# Patient Record
Sex: FEMALE | Race: WHITE | HISPANIC OR LATINO | URBAN - METROPOLITAN AREA
[De-identification: names, ages, dates, MRNs, and addresses within clinical notes are randomized per-mention and may not be internally consistent; named-entity substitution may affect disease eponyms.]

---

## 2020-12-21 ENCOUNTER — INPATIENT (INPATIENT)
Facility: HOSPITAL | Age: 70
LOS: 1 days | Discharge: HOME | DRG: 177 | End: 2020-12-23
Attending: HOSPITALIST | Admitting: INTERNAL MEDICINE
Payer: MEDICARE

## 2020-12-21 ENCOUNTER — TRANSCRIPTION ENCOUNTER (OUTPATIENT)
Age: 70
End: 2020-12-21

## 2020-12-21 VITALS
TEMPERATURE: 98 F | OXYGEN SATURATION: 96 % | HEART RATE: 102 BPM | RESPIRATION RATE: 18 BRPM | DIASTOLIC BLOOD PRESSURE: 82 MMHG | SYSTOLIC BLOOD PRESSURE: 156 MMHG

## 2020-12-21 DIAGNOSIS — R09.02 HYPOXEMIA: ICD-10-CM

## 2020-12-21 LAB
ALBUMIN SERPL ELPH-MCNC: 2.9 G/DL — LOW (ref 3.3–5)
ALP SERPL-CCNC: 308 U/L — HIGH (ref 40–120)
ALT FLD-CCNC: 108 U/L — HIGH (ref 12–78)
ANION GAP SERPL CALC-SCNC: 8 MMOL/L — SIGNIFICANT CHANGE UP (ref 5–17)
APTT BLD: 33.1 SEC — SIGNIFICANT CHANGE UP (ref 27.5–35.5)
AST SERPL-CCNC: 42 U/L — HIGH (ref 15–37)
BASE EXCESS BLDV CALC-SCNC: 2.7 MMOL/L — HIGH (ref -2–2)
BASOPHILS # BLD AUTO: 0.06 K/UL — SIGNIFICANT CHANGE UP (ref 0–0.2)
BASOPHILS NFR BLD AUTO: 0.6 % — SIGNIFICANT CHANGE UP (ref 0–2)
BILIRUB SERPL-MCNC: 0.5 MG/DL — SIGNIFICANT CHANGE UP (ref 0.2–1.2)
BUN SERPL-MCNC: 8 MG/DL — SIGNIFICANT CHANGE UP (ref 7–23)
CALCIUM SERPL-MCNC: 9 MG/DL — SIGNIFICANT CHANGE UP (ref 8.5–10.1)
CHLORIDE SERPL-SCNC: 108 MMOL/L — SIGNIFICANT CHANGE UP (ref 96–108)
CK SERPL-CCNC: 61 U/L — SIGNIFICANT CHANGE UP (ref 26–192)
CO2 SERPL-SCNC: 26 MMOL/L — SIGNIFICANT CHANGE UP (ref 22–31)
CREAT SERPL-MCNC: 0.73 MG/DL — SIGNIFICANT CHANGE UP (ref 0.5–1.3)
D DIMER BLD IA.RAPID-MCNC: 9077 NG/ML DDU — HIGH
EOSINOPHIL # BLD AUTO: 0.09 K/UL — SIGNIFICANT CHANGE UP (ref 0–0.5)
EOSINOPHIL NFR BLD AUTO: 0.8 % — SIGNIFICANT CHANGE UP (ref 0–6)
GLUCOSE SERPL-MCNC: 108 MG/DL — HIGH (ref 70–99)
HCO3 BLDV-SCNC: 26 MMOL/L — SIGNIFICANT CHANGE UP (ref 21–29)
HCT VFR BLD CALC: 34.3 % — LOW (ref 34.5–45)
HGB BLD-MCNC: 11.5 G/DL — SIGNIFICANT CHANGE UP (ref 11.5–15.5)
IMM GRANULOCYTES NFR BLD AUTO: 1.6 % — HIGH (ref 0–1.5)
INR BLD: 1.24 RATIO — HIGH (ref 0.88–1.16)
LACTATE SERPL-SCNC: 0.9 MMOL/L — SIGNIFICANT CHANGE UP (ref 0.7–2)
LYMPHOCYTES # BLD AUTO: 1.87 K/UL — SIGNIFICANT CHANGE UP (ref 1–3.3)
LYMPHOCYTES # BLD AUTO: 17.3 % — SIGNIFICANT CHANGE UP (ref 13–44)
MCHC RBC-ENTMCNC: 29.6 PG — SIGNIFICANT CHANGE UP (ref 27–34)
MCHC RBC-ENTMCNC: 33.5 GM/DL — SIGNIFICANT CHANGE UP (ref 32–36)
MCV RBC AUTO: 88.4 FL — SIGNIFICANT CHANGE UP (ref 80–100)
MONOCYTES # BLD AUTO: 1.18 K/UL — HIGH (ref 0–0.9)
MONOCYTES NFR BLD AUTO: 10.9 % — SIGNIFICANT CHANGE UP (ref 2–14)
NEUTROPHILS # BLD AUTO: 7.41 K/UL — HIGH (ref 1.8–7.4)
NEUTROPHILS NFR BLD AUTO: 68.8 % — SIGNIFICANT CHANGE UP (ref 43–77)
NT-PROBNP SERPL-SCNC: 123 PG/ML — SIGNIFICANT CHANGE UP (ref 0–125)
PCO2 BLDV: 39 MMHG — SIGNIFICANT CHANGE UP (ref 35–50)
PH BLDV: 7.45 — SIGNIFICANT CHANGE UP (ref 7.35–7.45)
PLATELET # BLD AUTO: 378 K/UL — SIGNIFICANT CHANGE UP (ref 150–400)
PO2 BLDV: 68 MMHG — HIGH (ref 25–45)
POTASSIUM SERPL-MCNC: 3.1 MMOL/L — LOW (ref 3.5–5.3)
POTASSIUM SERPL-SCNC: 3.1 MMOL/L — LOW (ref 3.5–5.3)
PROT SERPL-MCNC: 7.9 GM/DL — SIGNIFICANT CHANGE UP (ref 6–8.3)
PROTHROM AB SERPL-ACNC: 14.2 SEC — HIGH (ref 10.6–13.6)
RBC # BLD: 3.88 M/UL — SIGNIFICANT CHANGE UP (ref 3.8–5.2)
RBC # FLD: 12.6 % — SIGNIFICANT CHANGE UP (ref 10.3–14.5)
SAO2 % BLDV: 94 % — HIGH (ref 67–88)
SARS-COV-2 RNA SPEC QL NAA+PROBE: DETECTED
SODIUM SERPL-SCNC: 142 MMOL/L — SIGNIFICANT CHANGE UP (ref 135–145)
TROPONIN I SERPL-MCNC: <0.015 NG/ML — SIGNIFICANT CHANGE UP (ref 0.01–0.04)
WBC # BLD: 10.78 K/UL — HIGH (ref 3.8–10.5)
WBC # FLD AUTO: 10.78 K/UL — HIGH (ref 3.8–10.5)

## 2020-12-21 PROCEDURE — 93010 ELECTROCARDIOGRAM REPORT: CPT

## 2020-12-21 PROCEDURE — 71275 CT ANGIOGRAPHY CHEST: CPT | Mod: 26

## 2020-12-21 PROCEDURE — 80053 COMPREHEN METABOLIC PANEL: CPT

## 2020-12-21 PROCEDURE — 93970 EXTREMITY STUDY: CPT

## 2020-12-21 PROCEDURE — 86803 HEPATITIS C AB TEST: CPT

## 2020-12-21 PROCEDURE — 85027 COMPLETE CBC AUTOMATED: CPT

## 2020-12-21 PROCEDURE — 36415 COLL VENOUS BLD VENIPUNCTURE: CPT

## 2020-12-21 PROCEDURE — 99223 1ST HOSP IP/OBS HIGH 75: CPT

## 2020-12-21 PROCEDURE — 93970 EXTREMITY STUDY: CPT | Mod: 26

## 2020-12-21 PROCEDURE — 71045 X-RAY EXAM CHEST 1 VIEW: CPT | Mod: 26

## 2020-12-21 PROCEDURE — 71275 CT ANGIOGRAPHY CHEST: CPT

## 2020-12-21 RX ORDER — ENOXAPARIN SODIUM 100 MG/ML
90 INJECTION SUBCUTANEOUS EVERY 12 HOURS
Refills: 0 | Status: DISCONTINUED | OUTPATIENT
Start: 2020-12-22 | End: 2020-12-22

## 2020-12-21 RX ORDER — ACETAMINOPHEN 500 MG
650 TABLET ORAL EVERY 6 HOURS
Refills: 0 | Status: DISCONTINUED | OUTPATIENT
Start: 2020-12-21 | End: 2020-12-23

## 2020-12-21 RX ORDER — LEVOTHYROXINE SODIUM 125 MCG
75 TABLET ORAL DAILY
Refills: 0 | Status: DISCONTINUED | OUTPATIENT
Start: 2020-12-21 | End: 2020-12-23

## 2020-12-21 RX ORDER — ASCORBIC ACID 60 MG
500 TABLET,CHEWABLE ORAL DAILY
Refills: 0 | Status: DISCONTINUED | OUTPATIENT
Start: 2020-12-21 | End: 2020-12-23

## 2020-12-21 RX ORDER — DEXAMETHASONE 0.5 MG/5ML
6 ELIXIR ORAL DAILY
Refills: 0 | Status: DISCONTINUED | OUTPATIENT
Start: 2020-12-22 | End: 2020-12-23

## 2020-12-21 RX ORDER — PANTOPRAZOLE SODIUM 20 MG/1
40 TABLET, DELAYED RELEASE ORAL
Refills: 0 | Status: DISCONTINUED | OUTPATIENT
Start: 2020-12-21 | End: 2020-12-23

## 2020-12-21 RX ORDER — CHOLECALCIFEROL (VITAMIN D3) 125 MCG
1000 CAPSULE ORAL DAILY
Refills: 0 | Status: DISCONTINUED | OUTPATIENT
Start: 2020-12-21 | End: 2020-12-23

## 2020-12-21 RX ORDER — ONDANSETRON 8 MG/1
4 TABLET, FILM COATED ORAL EVERY 6 HOURS
Refills: 0 | Status: DISCONTINUED | OUTPATIENT
Start: 2020-12-21 | End: 2020-12-23

## 2020-12-21 RX ORDER — DEXAMETHASONE 0.5 MG/5ML
6 ELIXIR ORAL ONCE
Refills: 0 | Status: COMPLETED | OUTPATIENT
Start: 2020-12-21 | End: 2020-12-21

## 2020-12-21 RX ORDER — ENOXAPARIN SODIUM 100 MG/ML
90 INJECTION SUBCUTANEOUS ONCE
Refills: 0 | Status: COMPLETED | OUTPATIENT
Start: 2020-12-21 | End: 2020-12-21

## 2020-12-21 RX ORDER — LEVOTHYROXINE SODIUM 125 MCG
1 TABLET ORAL
Qty: 0 | Refills: 0 | DISCHARGE

## 2020-12-21 RX ORDER — ZINC SULFATE TAB 220 MG (50 MG ZINC EQUIVALENT) 220 (50 ZN) MG
220 TAB ORAL DAILY
Refills: 0 | Status: DISCONTINUED | OUTPATIENT
Start: 2020-12-21 | End: 2020-12-23

## 2020-12-21 RX ORDER — ALBUTEROL 90 UG/1
2 AEROSOL, METERED ORAL ONCE
Refills: 0 | Status: COMPLETED | OUTPATIENT
Start: 2020-12-21 | End: 2020-12-21

## 2020-12-21 RX ADMIN — Medication 6 MILLIGRAM(S): at 16:52

## 2020-12-21 RX ADMIN — ALBUTEROL 2 PUFF(S): 90 AEROSOL, METERED ORAL at 15:32

## 2020-12-21 RX ADMIN — ENOXAPARIN SODIUM 90 MILLIGRAM(S): 100 INJECTION SUBCUTANEOUS at 20:39

## 2020-12-21 NOTE — ED PROVIDER NOTE - NS ED ROS FT
Review of Systems:  	•	CONSTITUTIONAL: no fever  	•	SKIN: no rash  	•	RESPIRATORY: +shortness of breath, +NO, +cough  	•	CARDIAC: no chest pain  	•	GI:  no abd pain, no nausea, no vomiting, no diarrhea  	•	GENITO-URINARY:  no dysuria  	•	MUSCULOSKELETAL:  no back pain, +calf pain  	•	NEUROLOGIC: no weakness, +headache  	•	ALLERGY: no rhinorrhea  	•	PSYSCHIATRIC: appropriate concern about symptoms

## 2020-12-21 NOTE — H&P ADULT - ASSESSMENT
COVID   Bilateral Infiltrates  Hypoxia to 90%  Elevated Infl Markers DD 9000  Hypokalemia  -    Pt is  admitted w/  COVID -19   Bilateral Infiltrates  Hypoxia to 90%  Elevated Infl Markers DD 9077, CRP 10, Ferritin 467, procalcitonin 0.18,  mild transaminitis  Hypokalemia  CT concerning for scattered tiny small PEs  vs incomplete opacification and COVID-19 peripheral opacities  - s/p lovenox 90 mg in the ED, will cont  - s/p decadron 6mg, cont with daily 6mg  - O2 support and proning protocol  - ID consult  - replete potassium  - DVT prophylaxis : pt on lovenox  - vteIMPROVE VTE Individual Risk Assessment    RISK                                                                Points    [  ] Previous VTE                                                  3    [ ? ] Thrombophilia                                               2    [  ] Lower limb paralysis                                      2        (unable to hold up >15 seconds)      [  ] Current Cancer                                              2         (within 6 months)    [X  ] Immobilization > 24 hrs                                1    [  ] ICU/CCU stay > 24 hours                              1    [ X ] Age > 60                                                      1    IMPROVE VTE Score ___4______    IMPROVE Score 0-1: Low Risk, No VTE prophylaxis required for most patients, encourage ambulation.   IMPROVE Score 2-3: At risk, pharmacologic VTE prophylaxis is indicated for most patients (in the absence of a contraindication)  IMPROVE Score > or = 4: High Risk, pharmacologic VTE prophylaxis is indicated for most patients (in the absence of a contraindication)   Pt is  admitted w/  COVID -19   Bilateral Infiltrates  Hypoxia to 90% nintermittently  Elevated Infl Markers DD 9077, CRP 10, Ferritin 467, procalcitonin 0.18,  mild transaminitis  Hypokalemia  CT concerning for scattered tiny small PEs  vs incomplete opacification and COVID-19 peripheral opacities  - s/p lovenox 90 mg in the ED, will cont  - s/p decadron 6mg, cont with daily 6mg  - note on telemetry , pt is now 95% on RA  - O2 support and proning protocol as needed  - ID consult  - replete potassium  - DVT prophylaxis : pt on lovenox  - vteIMPROVE VTE Individual Risk Assessment    RISK                                                                Points    [  ] Previous VTE                                                  3    [ ? ] Thrombophilia                                               2    [  ] Lower limb paralysis                                      2        (unable to hold up >15 seconds)      [  ] Current Cancer                                              2         (within 6 months)    [X  ] Immobilization > 24 hrs                                1    [  ] ICU/CCU stay > 24 hours                              1    [ X ] Age > 60                                                      1    IMPROVE VTE Score ___4______    IMPROVE Score 0-1: Low Risk, No VTE prophylaxis required for most patients, encourage ambulation.   IMPROVE Score 2-3: At risk, pharmacologic VTE prophylaxis is indicated for most patients (in the absence of a contraindication)  IMPROVE Score > or = 4: High Risk, pharmacologic VTE prophylaxis is indicated for most patients (in the absence of a contraindication)

## 2020-12-21 NOTE — ED PROVIDER NOTE - CLINICAL SUMMARY MEDICAL DECISION MAKING FREE TEXT BOX
COVID + with viral PNA, O2 sat intermittently 90% on RA, will give Decadron, will evaluate for DVT/PE since d dimer is elevated. Hospitalist with f/u CT angio results. COVID + with viral PNA, O2 sat intermittently 90% on RA, will give Decadron, will evaluate for DVT/PE since d dimer is elevated. Hospitalist will f/u CT angio results.

## 2020-12-21 NOTE — H&P ADULT - HISTORY OF PRESENT ILLNESS
71 y/o Patient p/w CC shortness of breath x 2 days, new onset. +associated NO. Pt COVID + on 12/03/20. Pt also c/o headache, persistent cough and b/l calf pain. Pt sent from Urgent Care to r/o PE. Pt intermittently desats to 90% on RA.    	PMH/PSH relevant for: Hypothyroidism  	ROS negative for: fever, Chest pain, Nausea, vomiting, diarrhea, abdominal pain, dysuria Pt is a 71 y/o Female w/PmHx Hypothyroidism who was  diagnosed with COVID-19 on Dec 13, 2020 who is now sent from Urgent care  to the ED with  increasing  shortness of breath  for two days and also dyspnea on exertion. The provider at Urgent Care sent pt to be ruled out for PE.   Pt's O2 sat has gone down to  90% on RA.   She reports having some lower calf pain last week .  She reports having a cough and headache.  She denies fever/chills,  no chest pain/no abd pain or symptoms,  no urinary complaints.

## 2020-12-21 NOTE — ED PROVIDER NOTE - CARE PLAN
Principal Discharge DX:	Hypoxia  Secondary Diagnosis:	COVID-19  Secondary Diagnosis:	Infiltrate of lung present on imaging of chest

## 2020-12-21 NOTE — H&P ADULT - NSHPLABSRESULTS_GEN_ALL_CORE
14:15 EKG:  NSR 98 bpm, tiny qs in I, avL      < from: CT Angio Chest PE Protocol w/ IV Cont (12.21.20 @ 18:17) >  EXAM:  CT ANGIO CHEST PE PROTOCOL IC                         PROCEDURE DATE:  12/21/2020      INTERPRETATION:  CLINICAL INFORMATION: Shortness of breath. Covid positive. Evaluate for pulmonary embolism    COMPARISON: Chest x-ray 12/21/2020.    PROCEDURE:  CT Angiography of the Chest.  90 ml of Omnipaque 350 was injected intravenously. 10 ml were discarded.  Sagittal and coronal reformats were performed as well as 3D (MIP) reconstructions.    FINDINGS:    LUNGS AND AIRWAYS: Patent central airways.  Scattered patchy primarily peripheral lung opacities consistent with Covid19 infection.  PLEURA: No pleural effusion.  MEDIASTINUM AND CHARY: No lymphadenopathy.  VESSELS: There is no central or segmental pulmonary embolism. In the subsegmental areas there is the suggestion of multiple small bilateral tiny pulmonary emboli versus incomplete opacification. For example series 3 image 273 in the right lower lobe.  HEART: Heart size is normal. No pericardial effusion.  CHEST WALL AND LOWER NECK: Partially imaged prominent left posterior lateral intramuscular lipoma.  VISUALIZED UPPER ABDOMEN: Patient is post cholecystectomy and there is partially imaged marked enlargement of the biliary tree up to 18 mm. Partially imaged 1.3 cm left periaortic cystic nodule.  BONES: Degenerative changes.    IMPRESSION:  Scattered patchy primarily peripheral lung opacities consistent with Covid19 infection.    There is no central or segmental pulmonary embolism. In the subsegmental areas there is the suggestion of multiple small bilateral tiny pulmonary emboli versus incomplete opacification. For example series 3 image 273 in the right lower lobe.        PATRICIA BRANHAM MD; Attending Radiologist  This document has been electronically signed. Dec 21 2020  6:56PM  < end of copied text >

## 2020-12-21 NOTE — ED STATDOCS - PROGRESS NOTE DETAILS
[place]is [place] for attending Dr. Angel: 71 y/o female with no significant PMHx presents to the ED c/o SOB. Pt COVID +. Pt was seen at urgent care and was sent to r/o PE. Pt hypoxic, 92% on RA. Will send pt to main ED for further evaluation.

## 2020-12-21 NOTE — ED PROVIDER NOTE - OBJECTIVE STATEMENT
Pertinent HPI/PMH/PSH/FHx/SHx and Review of Systems contained within  HPI: 69 y/o Patient p/w CC shortness of breath x 2 days, new onset. +associated NO. Pt COVID + on 12/02/20. Pt also c/o headache, persistent cough and b/l calf pain. Pt sent from Urgent Care to r/o PE. Pt intermittently desats to 90% on RA. PMD: Dr. Josette Carpenter.   PMH/PSH relevant for: Hypothyroidism  ROS negative for: fever, Chest pain, Nausea, vomiting, diarrhea, abdominal pain, dysuria    FamilyHx and SocialHx not otherwise contributory Pertinent HPI/PMH/PSH/FHx/SHx and Review of Systems contained within  HPI: 69 y/o Patient p/w CC shortness of breath x 2 days, new onset. +associated NO. Pt COVID + on 12/03/20. Pt also c/o headache, persistent cough and b/l calf pain. Pt sent from Urgent Care to r/o PE. Pt intermittently desats to 90% on RA. PMD: Dr. Josette Carpenter.   PMH/PSH relevant for: Hypothyroidism  ROS negative for: fever, Chest pain, Nausea, vomiting, diarrhea, abdominal pain, dysuria    FamilyHx and SocialHx not otherwise contributory

## 2020-12-21 NOTE — ED ADULT NURSE NOTE - OBJECTIVE STATEMENT
Pt presents to ED for worsening SOB x2 days. Pt COVID + on december 3,2020. Pt sent from urgent care to r/o PE. On ambulation, pt O2 saturation 92-93%. Initially when dx developed headaches. Now endorses persistent non-productive cough, and occasional diarrhea. Denies fever/chills. pt on the monitor, will ctm.

## 2020-12-21 NOTE — ED PROVIDER NOTE - PROGRESS NOTE DETAILS
jin: Discussed need to admit with patient & discussed risk and benefits.  Patient agreed to admission.  Discussed case w/ admitting doctor - agreed to admit to their service. will place bridge orders. Accepting physician said: APPROVE PT TO MOVE    prior to inpatient team evaluation jin: dr corea will f/u cta & u/s results

## 2020-12-21 NOTE — ED PROVIDER NOTE - PHYSICAL EXAMINATION
*GEN: No acute distress, well appearing   *HEAD: Normocephalic, Atraumatic  *EYES/NOSE: b/l Pupils symmetric & Reactive to light, EOMI b/l  *THROAT: airway patent, moist mucous membranes  *NECK: Neck supple  *PULMONARY: No Respiratory distress, symmetric b/l chest rise  *CARDIAC: s1s2, regular rhythm   *ABDOMEN:  Non Tender, Non Distended, soft, no guarding, no rebound, no masses   *BACK: no CVA tenderness, No midline vertebral tenderness to palpation   *EXTREMITIES: symmetric pulses, 2+ DP & radial pulses, no cyanosis, no edema   *SKIN: no rash, no bruising   *NEUROLOGIC: alert,  full active & passive ROM in all 4 extremities,   *PSYCH: appropriate concern about symptoms, pleasant

## 2020-12-21 NOTE — ED ADULT TRIAGE NOTE - CHIEF COMPLAINT QUOTE
Patient presents in ED with SOB sent from Urgent care to r/o PE. Patient appears in no distress in triage.

## 2020-12-21 NOTE — H&P ADULT - NSHPPHYSICALEXAM_GEN_ALL_CORE
ICU Vital Signs Last 24 Hrs    T(F): 98.5 (21 Dec 2020 14:13), Max: 98.5 (21 Dec 2020 14:13)  HR: 102 (21 Dec 2020 14:13) (102 - 102)  BP: 156/82 (21 Dec 2020 14:13) (156/82 - 156/82)  BP(mean): 103 (21 Dec 2020 14:13) (103 - 103)    RR: 18 (21 Dec 2020 14:13) (18 - 18)  SpO2: 90% (21 Dec 2020 15:00) (90% - 92%)

## 2020-12-22 LAB
ALBUMIN SERPL ELPH-MCNC: 2.6 G/DL — LOW (ref 3.3–5)
ALP SERPL-CCNC: 277 U/L — HIGH (ref 40–120)
ALT FLD-CCNC: 91 U/L — HIGH (ref 12–78)
ANION GAP SERPL CALC-SCNC: 7 MMOL/L — SIGNIFICANT CHANGE UP (ref 5–17)
AST SERPL-CCNC: 34 U/L — SIGNIFICANT CHANGE UP (ref 15–37)
BILIRUB SERPL-MCNC: 0.3 MG/DL — SIGNIFICANT CHANGE UP (ref 0.2–1.2)
BUN SERPL-MCNC: 12 MG/DL — SIGNIFICANT CHANGE UP (ref 7–23)
CALCIUM SERPL-MCNC: 9.1 MG/DL — SIGNIFICANT CHANGE UP (ref 8.5–10.1)
CHLORIDE SERPL-SCNC: 108 MMOL/L — SIGNIFICANT CHANGE UP (ref 96–108)
CO2 SERPL-SCNC: 25 MMOL/L — SIGNIFICANT CHANGE UP (ref 22–31)
CREAT SERPL-MCNC: 0.68 MG/DL — SIGNIFICANT CHANGE UP (ref 0.5–1.3)
CRP SERPL-MCNC: 10.01 MG/DL — HIGH (ref 0–0.4)
FERRITIN SERPL-MCNC: 467 NG/ML — HIGH (ref 15–150)
GLUCOSE SERPL-MCNC: 112 MG/DL — HIGH (ref 70–99)
HCT VFR BLD CALC: 31.4 % — LOW (ref 34.5–45)
HGB BLD-MCNC: 10.4 G/DL — LOW (ref 11.5–15.5)
MCHC RBC-ENTMCNC: 29.7 PG — SIGNIFICANT CHANGE UP (ref 27–34)
MCHC RBC-ENTMCNC: 33.1 GM/DL — SIGNIFICANT CHANGE UP (ref 32–36)
MCV RBC AUTO: 89.7 FL — SIGNIFICANT CHANGE UP (ref 80–100)
PLATELET # BLD AUTO: 368 K/UL — SIGNIFICANT CHANGE UP (ref 150–400)
POTASSIUM SERPL-MCNC: 4.3 MMOL/L — SIGNIFICANT CHANGE UP (ref 3.5–5.3)
POTASSIUM SERPL-SCNC: 4.3 MMOL/L — SIGNIFICANT CHANGE UP (ref 3.5–5.3)
PROCALCITONIN SERPL-MCNC: 0.18 NG/ML — HIGH (ref 0.02–0.1)
PROT SERPL-MCNC: 7.4 GM/DL — SIGNIFICANT CHANGE UP (ref 6–8.3)
RBC # BLD: 3.5 M/UL — LOW (ref 3.8–5.2)
RBC # FLD: 12.8 % — SIGNIFICANT CHANGE UP (ref 10.3–14.5)
SODIUM SERPL-SCNC: 140 MMOL/L — SIGNIFICANT CHANGE UP (ref 135–145)
WBC # BLD: 10.95 K/UL — HIGH (ref 3.8–10.5)
WBC # FLD AUTO: 10.95 K/UL — HIGH (ref 3.8–10.5)

## 2020-12-22 PROCEDURE — 99232 SBSQ HOSP IP/OBS MODERATE 35: CPT

## 2020-12-22 PROCEDURE — 99223 1ST HOSP IP/OBS HIGH 75: CPT

## 2020-12-22 RX ORDER — POTASSIUM CHLORIDE 20 MEQ
40 PACKET (EA) ORAL EVERY 4 HOURS
Refills: 0 | Status: COMPLETED | OUTPATIENT
Start: 2020-12-22 | End: 2020-12-22

## 2020-12-22 RX ORDER — SENNA PLUS 8.6 MG/1
2 TABLET ORAL AT BEDTIME
Refills: 0 | Status: DISCONTINUED | OUTPATIENT
Start: 2020-12-22 | End: 2020-12-23

## 2020-12-22 RX ORDER — RIVAROXABAN 15 MG-20MG
15 KIT ORAL
Refills: 0 | Status: DISCONTINUED | OUTPATIENT
Start: 2020-12-22 | End: 2020-12-23

## 2020-12-22 RX ORDER — RIVAROXABAN 15 MG-20MG
1 KIT ORAL
Qty: 40 | Refills: 0
Start: 2020-12-22 | End: 2021-01-10

## 2020-12-22 RX ORDER — POTASSIUM CHLORIDE 20 MEQ
10 PACKET (EA) ORAL
Refills: 0 | Status: DISCONTINUED | OUTPATIENT
Start: 2020-12-22 | End: 2020-12-22

## 2020-12-22 RX ORDER — POTASSIUM CHLORIDE 20 MEQ
40 PACKET (EA) ORAL ONCE
Refills: 0 | Status: COMPLETED | OUTPATIENT
Start: 2020-12-22 | End: 2020-12-22

## 2020-12-22 RX ADMIN — Medication 100 MILLIGRAM(S): at 04:18

## 2020-12-22 RX ADMIN — Medication 75 MICROGRAM(S): at 05:59

## 2020-12-22 RX ADMIN — RIVAROXABAN 15 MILLIGRAM(S): KIT at 21:23

## 2020-12-22 RX ADMIN — PANTOPRAZOLE SODIUM 40 MILLIGRAM(S): 20 TABLET, DELAYED RELEASE ORAL at 05:59

## 2020-12-22 RX ADMIN — Medication 40 MILLIEQUIVALENT(S): at 05:59

## 2020-12-22 RX ADMIN — ENOXAPARIN SODIUM 90 MILLIGRAM(S): 100 INJECTION SUBCUTANEOUS at 11:58

## 2020-12-22 RX ADMIN — SENNA PLUS 2 TABLET(S): 8.6 TABLET ORAL at 19:55

## 2020-12-22 RX ADMIN — Medication 500 MILLIGRAM(S): at 11:58

## 2020-12-22 RX ADMIN — Medication 650 MILLIGRAM(S): at 21:26

## 2020-12-22 RX ADMIN — Medication 10 MILLIGRAM(S): at 19:42

## 2020-12-22 RX ADMIN — Medication 40 MILLIEQUIVALENT(S): at 06:00

## 2020-12-22 RX ADMIN — Medication 6 MILLIGRAM(S): at 11:59

## 2020-12-22 RX ADMIN — ZINC SULFATE TAB 220 MG (50 MG ZINC EQUIVALENT) 220 MILLIGRAM(S): 220 (50 ZN) TAB at 11:58

## 2020-12-22 RX ADMIN — Medication 1000 UNIT(S): at 11:58

## 2020-12-22 NOTE — CONSULT NOTE ADULT - ASSESSMENT
69 y/o Female w/PmHx Hypothyroidism who was  diagnosed with COVID-19 on Dec 13, 2020 who is now sent from Urgent care  to the ED with increasing shortness of breath  for two days and also dyspnea on exertion. The provider at Urgent Care sent pt to be ruled out for PE. Pt's O2 sat has gone down to 90% on RA. She reports having some lower calf pain last week. She reports having a cough and headache. She denies fever/chills, no chest pain/no abd pain or symptoms, no urinary complaints. Here, covid-19 pcr positive, CTA with bilateral lung infiltrates, possible multiple small tiny pulmonary emboli, was given steroids, saturating well on RA, not on o2.     1. dyspnea. covid-19 viral syndrome. multifocal pneumonia. possible pulmonary emboli  - on ac  - not requiring supplemental o2, does not require remdesivir   - on decadron 6mg daily - can also consider dc as resp status stable  - isolation precautions  - fu cbc  - supportive care  - monitor o2 closely    2. other issues - care per medicine 
This is a 70 year old female sustaining pulmonary emboli in the hypercoaguable setting of COVID+ with high risk fro VTE due to current VTE and COVID. She is low bleeding risk. Recommend transitioning from lovenox to DOAC. Patient amenable to Xarelto. Will send to pharmacy.    Discussed Xarelto and the risks and benefits with patient. Emphasized the importance of taking medication twice daily to treat VTE. Verbalized understanding and is in agreement with treatment plan.    Plan:  ::DC Lovenox  ::Give Xarelto at next scheduled Lovenox dose- 10pm  ::Xarelto 15mg PO twice daily x 21 days, last dose 1/11/21  ::Starting 1/21/21: Xarelto 20 mg PO daily  ::Daily CBC/BMP  ::Venodynes  ::Enc ambulation    Thank you for this consult, will continue to follow.  Dispo: Home

## 2020-12-22 NOTE — CONSULT NOTE ADULT - SUBJECTIVE AND OBJECTIVE BOX
Patient is a 70y old  Female who presents with a chief complaint of NO  COVID Positive (21 Dec 2020 17:51)    HPI:  Pt is a 69 y/o Female w/PmHx Hypothyroidism who was  diagnosed with COVID-19 on Dec 13, 2020 who is now sent from Urgent care  to the ED with increasing shortness of breath  for two days and also dyspnea on exertion. The provider at Urgent Care sent pt to be ruled out for PE. Pt's O2 sat has gone down to 90% on RA. She reports having some lower calf pain last week. She reports having a cough and headache. She denies fever/chills, no chest pain/no abd pain or symptoms, no urinary complaints. Here, covid-19 pcr positive, CTA with bilateral lung infiltrates, possible multiple small tiny pulmonary emboli, was given steroids, saturating well on RA, not on o2.     PMH: as above  PSH: as above  Meds: per reconciliation sheet, noted below  MEDICATIONS  (STANDING):  ascorbic acid 500 milliGRAM(s) Oral daily  cholecalciferol 1000 Unit(s) Oral daily  dexAMETHasone  Injectable 6 milliGRAM(s) IV Push daily  enoxaparin Injectable 90 milliGRAM(s) SubCutaneous every 12 hours  levothyroxine 75 MICROGram(s) Oral daily  pantoprazole    Tablet 40 milliGRAM(s) Oral before breakfast  zinc sulfate 220 milliGRAM(s) Oral daily      Allergies    No Known Allergies    Intolerances      Social: no smoking, no alcohol, no illegal drugs; no recent travel, no exposure to TB  FAMILY HISTORY:  Family history of lymphoma  mother  of this in her 60s.       no history of premature cardiovascular disease in first degree relatives    ROS: no HA, no dizziness, no sore throat, no blurry vision, no CP, no palpitations, no abdominal pain, no diarrhea, no N/V, no dysuria, no leg pain, no claudication, no rash, no joint aches, no rectal pain or bleeding, no night sweats    All other systems reviewed and are negative    Vital Signs Last 24 Hrs  T(C): 36.7 (22 Dec 2020 07:38), Max: 36.9 (21 Dec 2020 14:13)  T(F): 98 (22 Dec 2020 07:38), Max: 98.5 (21 Dec 2020 14:13)  HR: 77 (22 Dec 2020 07:38) (70 - 106)  BP: 133/64 (22 Dec 2020 07:38) (133/64 - 165/81)  BP(mean): 104 (21 Dec 2020 19:17) (103 - 104)  RR: 19 (22 Dec 2020 07:38) (17 - 22)  SpO2: 95% (22 Dec 2020 07:38) (90% - 95%)  Daily       PE:  Constitutional: NAD  HEENT: NC/AT, EOMI, PERRLA, conjunctivae clear; ears and nose atraumatic; pharynx benign  Neck: supple; thyroid not palpable  Back: no tenderness  Respiratory: decreased breath sounds  Cardiovascular: S1S2 regular, no murmurs  Abdomen: soft, not tender, not distended, positive BS; liver and spleen WNL  Genitourinary: no suprapubic tenderness  Lymphatic: no LN palpable  Musculoskeletal: no muscle tenderness, no joint swelling or tenderness  Extremities: no pedal edema  Neurological/ Psychiatric: AxOx3, Judgement and insight normal;  moving all extremities  Skin: no rashes; no palpable lesions    Labs: all available labs reviewed                        10.4   10.95 )-----------( 368      ( 22 Dec 2020 07:30 )             31.4     12    140  |  108  |  12  ----------------------------<  112<H>  4.3   |  25  |  0.68    Ca    9.1      22 Dec 2020 07:30    TPro  7.4  /  Alb  2.6<L>  /  TBili  0.3  /  DBili  x   /  AST  34  /  ALT  91<H>  /  AlkPhos  277<H>       LIVER FUNCTIONS - ( 22 Dec 2020 07:30 )  Alb: 2.6 g/dL / Pro: 7.4 gm/dL / ALK PHOS: 277 U/L / ALT: 91 U/L / AST: 34 U/L / GGT: x             Radiology: all available radiological tests reviewed  EXAM:  CT ANGIO CHEST PE PROTOCOL IC                             PROCEDURE DATE:  2020          INTERPRETATION:  CLINICAL INFORMATION: Shortness of breath. Covid positive. Evaluate for pulmonary embolism    COMPARISON: Chest x-ray 2020.    PROCEDURE:  CT Angiography of the Chest.  90 ml of Omnipaque 350 was injected intravenously. 10 ml were discarded.  Sagittal and coronal reformats were performed as well as 3D (MIP) reconstructions.    FINDINGS:    LUNGS AND AIRWAYS: Patent central airways.  Scattered patchy primarily peripheral lung opacities consistent with Covid19 infection.  PLEURA: No pleural effusion.  MEDIASTINUM AND CHARY: No lymphadenopathy.  VESSELS: There is no central or segmental pulmonary embolism. In the subsegmental areas there is the suggestion of multiple small bilateral tiny pulmonary emboli versus incomplete opacification. For example series 3 image 273 in the right lower lobe.  HEART: Heart size is normal. No pericardial effusion.  CHEST WALL AND LOWER NECK: Partially imaged prominent left posterior lateral intramuscular lipoma.  VISUALIZED UPPER ABDOMEN: Patient is post cholecystectomy and there is partially imaged marked enlargement of the biliary tree up to 18 mm. Partially imaged 1.3 cm left periaortic cystic nodule.  BONES: Degenerative changes.    IMPRESSION:  Scattered patchy primarily peripheral lung opacities consistent with Covid19 infection.    There is no central or segmental pulmonary embolism. In the subsegmental areas there is thesuggestion of multiple small bilateral tiny pulmonary emboli versus incomplete opacification. For example series 3 image 273 in the right lower lobe.    EXAM:  XR CHEST PORTABLE URGENT 1V                            PROCEDURE DATE:  2020          INTERPRETATION:  AP chest on 2020 at 3:43 PM. Patient is short of breath and positive for the Covid virus.    COMPARISON: None available.    Heart is magnified by technique.    Scattered midlung field lateral infiltrates left greater than right are noted consistent with Covid infection.    IMPRESSION: Mild bilateral infiltrates left greater than right.      Advanced directives addressed: full resuscitation
HPI:  Pt is a 71 y/o Female w/PmHx Hypothyroidism who was  diagnosed with COVID-19 on Dec 13, 2020 who is now sent from Urgent care  to the ED with  increasing  shortness of breath  for two days and also dyspnea on exertion. The provider at Urgent Care sent pt to be ruled out for PE.   Pt's O2 sat has gone down to  90% on RA.   She reports having some lower calf pain last week .  She reports having a cough and headache.  She denies fever/chills,  no chest pain/no abd pain or symptoms,  no urinary complaints.        Patient is a 70y old  Female who presents with a chief complaint of NO  COVID Positive (22 Dec 2020 14:14)    Consulted by Dr. Correa  for VTE prophylaxis, risk stratification, and anticoagulation management.    PAST MEDICAL & SURGICAL HISTORY:  Hypothyroidism    Interval History:  : Patient seen at bedside, OOB to chair. She states "My breathing is so much better, " denying shortness of breath or chest pain. Explained need for anticoagulation for 3 months post PE. Patient would like to take DOAC. Recommended Xarelto as it is twice daily for only 21 days then once daily. Will send to pharmacy. Stressed importance of adhering to dosing guidelines, especially twice daily dosing for acute period. Patient verbalized understanding.    IMPRESSION:  Scattered patchy primarily peripheral lung opacities consistent with Covid19 infection.    There is no central or segmental pulmonary embolism. In the subsegmental areas there is the suggestion of multiple small bilateral tiny pulmonary emboli versus incomplete opacification. For example series 3 image 273 in the right lower lobe.    CrCl: 98.2    IMPROVE VTE Risk Score:3    IMPROVE Bleeding Risk Score:1.5      Falls Risk:   High (  )  Mod (  )  Low (  x)      FAMILY HISTORY:  Family history of lymphoma  mother  of this in her 60s.      Denies any personal or familial history of clotting or bleeding disorders.    Allergies    No Known Allergies    Intolerances        REVIEW OF SYSTEMS    (  )Fever	        (  )Constipation	(  )SOB				  (  )Headache   (  )Dysuria  (  )Chills	        (  )Melena	        (  )Dyspnea on exertion (  )Dizziness    (  )Polyuria  (  )Nausea      (  )Hematochezia	(x  )Cough                          (  )Syncope      (  )Hematuria  (  )Vomiting   (  )Chest Pain	        (  )Wheezing			  (  )Weakness  (  )Diarrhea    (  )Palpitations	(  )Anorexia			  (  )Myalgia       (   ) Arthralgia    Pertinent positives in HPI and daily subjective. All other systems negative.    Vital Signs Last 24 Hrs  T(C): 36.7 (22 Dec 2020 07:38), Max: 36.9 (22 Dec 2020 00:20)  T(F): 98 (22 Dec 2020 07:38), Max: 98.4 (22 Dec 2020 00:20)  HR: 77 (22 Dec 2020 07:38) (70 - 106)  BP: 133/64 (22 Dec 2020 07:38) (133/64 - 165/81)  BP(mean): 104 (21 Dec 2020 19:17) (104 - 104)  RR: 19 (22 Dec 2020 07:38) (17 - 22)  SpO2: 95% (22 Dec 2020 07:38) (90% - 95%)      PHYSICAL EXAM:    Constitutional: Appears Well    Neurological: A& O x 3    Skin: Warm    Respiratory and Thorax: normal effort; Breath sounds: normal; No rales/wheezing/rhonchi, + cough  	  Cardiovascular: S1, S2, regular, NMBR	    Gastrointestinal: BS + x 4Q, nontender	    Musculoskeletal:   General Right:   no muscle/joint tenderness,   normal tone, no joint swelling,   ROM: full	    General Left:   no muscle/joint tenderness,   normal tone, no joint swelling,   ROM: ull    Lower extrems:   Right: no calf tenderness              negative concepcion's sign               + pedal pulses    Left:   no calf tenderness              negative concepcion's sign               + pedal pulses                          10.4   10.95 )-----------( 368      ( 22 Dec 2020 07:30 )             31.4       12    140  |  108  |  12  ----------------------------<  112<H>  4.3   |  25  |  0.68    Ca    9.1      22 Dec 2020 07:30    TPro  7.4  /  Alb  2.6<L>  /  TBili  0.3  /  DBili  x   /  AST  34  /  ALT  91<H>  /  AlkPhos  277<H>  12      PT/INR - ( 21 Dec 2020 15:22 )   PT: 14.2 sec;   INR: 1.24 ratio         PTT - ( 21 Dec 2020 15:22 )  PTT:33.1 sec				    MEDICATIONS  (STANDING):  ascorbic acid 500 milliGRAM(s) Oral daily  cholecalciferol 1000 Unit(s) Oral daily  dexAMETHasone  Injectable 6 milliGRAM(s) IV Push daily  levothyroxine 75 MICROGram(s) Oral daily  pantoprazole    Tablet 40 milliGRAM(s) Oral before breakfast  rivaroxaban 15 milliGRAM(s) Oral two times a day with meals  zinc sulfate 220 milliGRAM(s) Oral daily            **Current DVT Prophylaxis:    LMWH                   (  )  Heparin SQ           (  )  Coumadin             (  )  Xarelto                  ( x )  Eliquis                   (  )  Venodynes           ( x )  Ambulation          ( x )  UFH                       (  )  ECASA                   (  )  Contraindicated  (  )

## 2020-12-23 ENCOUNTER — TRANSCRIPTION ENCOUNTER (OUTPATIENT)
Age: 70
End: 2020-12-23

## 2020-12-23 VITALS
HEART RATE: 70 BPM | SYSTOLIC BLOOD PRESSURE: 139 MMHG | OXYGEN SATURATION: 97 % | DIASTOLIC BLOOD PRESSURE: 58 MMHG | TEMPERATURE: 98 F | RESPIRATION RATE: 20 BRPM

## 2020-12-23 LAB
HCV AB S/CO SERPL IA: 0.13 S/CO — SIGNIFICANT CHANGE UP (ref 0–0.99)
HCV AB SERPL-IMP: SIGNIFICANT CHANGE UP
SARS-COV-2 IGG SERPL QL IA: POSITIVE
SARS-COV-2 IGM SERPL IA-ACNC: 6.05 INDEX — HIGH

## 2020-12-23 PROCEDURE — 99239 HOSP IP/OBS DSCHRG MGMT >30: CPT

## 2020-12-23 PROCEDURE — 99231 SBSQ HOSP IP/OBS SF/LOW 25: CPT

## 2020-12-23 RX ADMIN — Medication 75 MICROGRAM(S): at 05:46

## 2020-12-23 RX ADMIN — Medication 1000 UNIT(S): at 10:38

## 2020-12-23 RX ADMIN — Medication 500 MILLIGRAM(S): at 10:38

## 2020-12-23 RX ADMIN — PANTOPRAZOLE SODIUM 40 MILLIGRAM(S): 20 TABLET, DELAYED RELEASE ORAL at 05:46

## 2020-12-23 RX ADMIN — RIVAROXABAN 15 MILLIGRAM(S): KIT at 10:39

## 2020-12-23 RX ADMIN — ZINC SULFATE TAB 220 MG (50 MG ZINC EQUIVALENT) 220 MILLIGRAM(S): 220 (50 ZN) TAB at 10:38

## 2020-12-23 NOTE — DISCHARGE NOTE PROVIDER - NSDCMRMEDTOKEN_GEN_ALL_CORE_FT
levothyroxine 75 mcg (0.075 mg) oral tablet: 1 tab(s) orally once a day  rivaroxaban 15 mg oral tablet: 1 tab by mouth twice daily with last dose on 1/11/2021. MDD:30mg  rivaroxaban 20 mg oral tablet: Starting 1/12/2021, 1 tab by mouth once daily with meal. MDD:20mg

## 2020-12-23 NOTE — PROGRESS NOTE ADULT - ASSESSMENT
This is a 70 year old female sustaining pulmonary emboli in the hypercoaguable setting of COVID+ with high risk fro VTE due to current VTE and COVID. She is low bleeding risk. Recommend transitioning from lovenox to DOAC. Patient amenable to Xarelto. Will send to pharmacy.    Discussed Xarelto and the risks and benefits with patient. Emphasized the importance of taking medication twice daily to treat VTE. Verbalized understanding and is in agreement with treatment plan.    Plan:  ::Xarelto 15mg PO twice daily x 21 days, last dose 1/11/21  ::Starting 1/12/21: Xarelto 20 mg PO daily  ::Daily CBC/BMP  ::Venodynes  ::Enc ambulation    Will continue to follow.  Dispo: Home  Copay $47  
Assessment and Plan:  69 y/o Female w/PmHx Hypothyroidism who was  diagnosed with COVID-19 on Dec 13, 2020 who is now sent from Urgent care  to the ED with  increasing  shortness of breath  for two days and also dyspnea on exertion    COVID -19 Pneumonia  Bilateral Infiltrates  Hypokalemia  High suspicion for PE given CT Scan findings, positive COVID test and high d-dimer. CT concerning for scattered tiny small PEs  vs incomplete opacification and COVID-19 peripheral opacities  elevated LFTs    - on lovenox. plan to change to DOAC. anticoag consulted  - s/p decadron 6mg, cont with daily 6mg, currently on RA, could d/c decadron tomorrow if no more hypoxia  - ID consult appreciated. not a candidate for remdesivir  - elevated LFT's, trending down. no GI symptoms. monitor    Advance directives/GOC/Code status: full  Dispo: inpatient. possible d/c tomorrow

## 2020-12-23 NOTE — DISCHARGE NOTE PROVIDER - HOSPITAL COURSE
Assessment and Plan:  69 y/o Female w/PmHx Hypothyroidism who was  diagnosed with COVID-19 on Dec 13, 2020 who is now sent from Urgent care  to the ED with  increasing  shortness of breath  for two days and also dyspnea on exertion    COVID -19 Pneumonia  Bilateral Infiltrates  Hypokalemia  High suspicion for PE given CT Scan findings, positive COVID test and high d-dimer. CT concerning for scattered tiny small PEs  vs incomplete opacification and COVID-19 peripheral opacities  elevated LFTs    - on lovenox. plan to change to DOAC. anticoag consulted  - s/p decadron 6mg, cont with daily 6mg, currently on RA, could d/c decadron tomorrow if no more hypoxia  - ID consult appreciated. not a candidate for remdesivir  - elevated LFT's, trending down. may be related to COVID. no GI symptoms. monitor. bile duct dilated on CT scan but pt is s/p cholecystectomy. she doesn't have any symptoms. bilirubin is completely normal. d/w patient to f/u w/ PCP for repeat labs. and discussed warning signs and if symptomatic to return to ER.    T(C): 36.9 (12-23-20 @ 07:34), Max: 36.9 (12-23-20 @ 07:34)  HR: 70 (12-23-20 @ 07:34) (70 - 90)  BP: 139/58 (12-23-20 @ 07:34) (139/58 - 154/74)  RR: 20 (12-23-20 @ 07:34) (20 - 20)  SpO2: 97% (12-23-20 @ 07:34) (96% - 97%)    Gen - Pleasant, cooperative, in no acute distress  HEENT- PERRL, moist mucus membranes, OP clear  CV - RRR, No m/r/g, +pulses, no edema  Lungs - Good effort, Clear to auscultation bilaterally except for trace crackles in the lung bases which is very minimal  Abdomen - Soft, nontender, nondistended, +BS, No rebound/rigidity/guarding  Ext - No cyanosis/clubbing.   Skin - No rashes, No jaundice  Psych- Alert & oriented x 3  Neuro- fluent speech, no facial droop, EOMI. moves all ext    Dispo: discharge to home in stable condition    Final diagnosis, treatment plan, and follow-up recommendations were discussed and explained to the patient. The patient was given an opportunity to ask questions concerning the diagnosis and treatment plan. The patient acknowledged understanding of the diagnosis, treatment, and follow-up recommendations. The patient was advised to seek urgent care upon discharge if worsening symptoms develop prior to scheduled follow-up. Time spent on discharge included time with the patient, and also coordinating discharge care as outlined below.    Total time spent: 50 min   Assessment and Plan:  71 y/o Female w/PmHx Hypothyroidism who was  diagnosed with COVID-19 on Dec 13, 2020 who is now sent from Urgent care  to the ED with  increasing  shortness of breath  for two days and also dyspnea on exertion    COVID -19 Pneumonia  Bilateral Infiltrates  Hypokalemia- replaced  suspected PE- High suspicion for PE given CT Scan findings, positive COVID test and high d-dimer. CT concerning for scattered tiny small PEs  vs incomplete opacification and COVID-19 peripheral opacities  elevated LFTs    - on lovenox. changed to xarelto. anticoag consulted  - s/p decadron 6mg, cont with daily 6mg, currently on RA, could d/c decadron tomorrow if no more hypoxia  - ID consult appreciated. not a candidate for remdesivir  - elevated LFT's, trending down. may be related to COVID. no GI symptoms. monitor. bile duct dilated on CT scan but pt is s/p cholecystectomy. she doesn't have any symptoms. bilirubin is completely normal. d/w patient to f/u w/ PCP for repeat labs. and discussed warning signs and if symptomatic to return to ER.    T(C): 36.9 (12-23-20 @ 07:34), Max: 36.9 (12-23-20 @ 07:34)  HR: 70 (12-23-20 @ 07:34) (70 - 90)  BP: 139/58 (12-23-20 @ 07:34) (139/58 - 154/74)  RR: 20 (12-23-20 @ 07:34) (20 - 20)  SpO2: 97% (12-23-20 @ 07:34) (96% - 97%)    Gen - Pleasant, cooperative, in no acute distress  HEENT- PERRL, moist mucus membranes, OP clear  CV - RRR, No m/r/g, +pulses, no edema  Lungs - Good effort, Clear to auscultation bilaterally except for trace crackles in the lung bases which is very minimal  Abdomen - Soft, nontender, nondistended, +BS, No rebound/rigidity/guarding  Ext - No cyanosis/clubbing.   Skin - No rashes, No jaundice  Psych- Alert & oriented x 3  Neuro- fluent speech, no facial droop, EOMI. moves all ext    Dispo: discharge to home in stable condition    Final diagnosis, treatment plan, and follow-up recommendations were discussed and explained to the patient. The patient was given an opportunity to ask questions concerning the diagnosis and treatment plan. The patient acknowledged understanding of the diagnosis, treatment, and follow-up recommendations. The patient was advised to seek urgent care upon discharge if worsening symptoms develop prior to scheduled follow-up. Time spent on discharge included time with the patient, and also coordinating discharge care as outlined below.    Total time spent: 50 min

## 2020-12-23 NOTE — PROGRESS NOTE ADULT - SUBJECTIVE AND OBJECTIVE BOX
CC/reason for follow up: covid, suspected PE    S: feels better today. no on RA. no NO    ROS: no chest pain, no dyspnea    T(C): 36.7 (12-22-20 @ 07:38), Max: 36.9 (12-22-20 @ 00:20)  HR: 77 (12-22-20 @ 07:38) (70 - 106)  BP: 133/64 (12-22-20 @ 07:38) (133/64 - 165/81)  RR: 19 (12-22-20 @ 07:38) (17 - 22)  SpO2: 95% (12-22-20 @ 07:38) (90% - 95%)    Gen - Pleasant, cooperative, in no acute distress  HEENT- PERRL, moist mucus membranes, OP clear  CV - RRR, No m/r/g, +pulses, no edema  Lungs - Good effort, Clear to auscultation bilaterally  Abdomen - Soft, nontender, nondistended, +BS, No rebound/rigidity/guarding  Ext - No cyanosis/clubbing.   Skin - No rashes, No jaundice  Psych- Alert & oriented x 3  Neuro- fluent speech, no facial droop, EOMI. moves all ext    MEDICATIONS  (STANDING):  ascorbic acid 500 milliGRAM(s) Oral daily  cholecalciferol 1000 Unit(s) Oral daily  dexAMETHasone  Injectable 6 milliGRAM(s) IV Push daily  enoxaparin Injectable 90 milliGRAM(s) SubCutaneous every 12 hours  levothyroxine 75 MICROGram(s) Oral daily  pantoprazole    Tablet 40 milliGRAM(s) Oral before breakfast  zinc sulfate 220 milliGRAM(s) Oral daily    MEDICATIONS  (PRN):  acetaminophen   Tablet .. 650 milliGRAM(s) Oral every 6 hours PRN Mild Pain (1 - 3)  ondansetron Injectable 4 milliGRAM(s) IV Push every 6 hours PRN Nausea and/or Vomiting      Diagnostic studies: personally reviewed  LABS: All Labs Reviewed:                        10.4   10.95 )-----------( 368      ( 22 Dec 2020 07:30 )             31.4     12-22    140  |  108  |  12  ----------------------------<  112<H>  4.3   |  25  |  0.68    Ca    9.1      22 Dec 2020 07:30    TPro  7.4  /  Alb  2.6<L>  /  TBili  0.3  /  DBili  x   /  AST  34  /  ALT  91<H>  /  AlkPhos  277<H>  12-22    PT/INR - ( 21 Dec 2020 15:22 )   PT: 14.2 sec;   INR: 1.24 ratio         PTT - ( 21 Dec 2020 15:22 )  PTT:33.1 sec  CARDIAC MARKERS ( 21 Dec 2020 15:22 )  <0.015 ng/mL / x     / 61 U/L / x     / x            Blood Culture:   RADIOLOGY/EKG:            
HPI:  Pt is a 69 y/o Female w/PmHx Hypothyroidism who was  diagnosed with COVID-19 on Dec 13, 2020 who is now sent from Urgent care  to the ED with  increasing  shortness of breath  for two days and also dyspnea on exertion. The provider at Urgent Care sent pt to be ruled out for PE.   Pt's O2 sat has gone down to  90% on RA.   She reports having some lower calf pain last week .  She reports having a cough and headache.  She denies fever/chills,  no chest pain/no abd pain or symptoms,  no urinary complaints.        Patient is a 70y old  Female who presents with a chief complaint of NO  COVID Positive (22 Dec 2020 14:14)    Consulted by Dr. Correa  for VTE prophylaxis, risk stratification, and anticoagulation management.    PAST MEDICAL & SURGICAL HISTORY:  Hypothyroidism    Interval History:  : Patient seen at bedside, OOB to chair. She states "My breathing is so much better, " denying shortness of breath or chest pain. Explained need for anticoagulation for 3 months post PE. Patient would like to take DOAC. Recommended Xarelto as it is twice daily for only 21 days then once daily. Will send to pharmacy. Stressed importance of adhering to dosing guidelines, especially twice daily dosing for acute period. Patient verbalized understanding.  20: Patient seen at bedside, reporting she is "Feeling so good," denying shortness of breath or chest pain. Explained dosing of Xarelto once again. Spoke to pharmacist- copay is $47. Stressed importance of taking as directed and to start the 20mg daily as of . Patient anette to demonstrate understanding through teachback method.    IMPRESSION:  Scattered patchy primarily peripheral lung opacities consistent with Covid19 infection.    There is no central or segmental pulmonary embolism. In the subsegmental areas there is the suggestion of multiple small bilateral tiny pulmonary emboli versus incomplete opacification. For example series 3 image 273 in the right lower lobe.    CrCl: 98.2    IMPROVE VTE Risk Score:3    IMPROVE Bleeding Risk Score:1.5      Falls Risk:   High (  )  Mod (  )  Low (  x)      FAMILY HISTORY:  Family history of lymphoma  mother  of this in her 60s.      Denies any personal or familial history of clotting or bleeding disorders.    Allergies    No Known Allergies    Intolerances        REVIEW OF SYSTEMS    (  )Fever	        (  )Constipation	(  )SOB				  (  )Headache   (  )Dysuria  (  )Chills	        (  )Melena	        (  )Dyspnea on exertion (  )Dizziness    (  )Polyuria  (  )Nausea      (  )Hematochezia	(x  )Cough                          (  )Syncope      (  )Hematuria  (  )Vomiting   (  )Chest Pain	        (  )Wheezing			  (  )Weakness  (  )Diarrhea    (  )Palpitations	(  )Anorexia			  (  )Myalgia       (   ) Arthralgia    Pertinent positives in HPI and daily subjective. All other systems negative.    Vital Signs Last 24 Hrs  T(C): 36.9 (20 @ 07:34), Max: 36.9 (20 @ :34)  T(F): 98.5 (20 @ :34), Max: 98.5 (20 @ 07:34)  HR: 70 (20 @ :34) (70 - 90)  BP: 139/58 (20 @ 07:34) (139/58 - 154/74)  BP(mean): --  RR: 20 (20 @ :34) (20 - 20)  SpO2: 97% (20 @ :34) (96% - 97%)      PHYSICAL EXAM:    Constitutional: Appears Well    Neurological: A& O x 3    Skin: Warm    Respiratory and Thorax: normal effort; Breath sounds: normal; No rales/wheezing/rhonchi, + cough  	  Cardiovascular: S1, S2, regular, NMBR	    Gastrointestinal: BS + x 4Q, nontender	    Musculoskeletal:   General Right:   no muscle/joint tenderness,   normal tone, no joint swelling,   ROM: full	    General Left:   no muscle/joint tenderness,   normal tone, no joint swelling,   ROM: full    Lower extrems:   Right: no calf tenderness              negative concepcion's sign               + pedal pulses    Left:   no calf tenderness              negative concepcion's sign               + pedal pulses                          10.4   10.95 )-----------( 368      ( 22 Dec 2020 07:30 )             31.4       12-    140  |  108  |  12  ----------------------------<  112<H>  4.3   |  25  |  0.68    Ca    9.1      22 Dec 2020 07:30    TPro  7.4  /  Alb  2.6<L>  /  TBili  0.3  /  DBili  x   /  AST  34  /  ALT  91<H>  /  AlkPhos  277<H>        PT/INR - ( 21 Dec 2020 15:22 )   PT: 14.2 sec;   INR: 1.24 ratio         PTT - ( 21 Dec 2020 15:22 )  PTT:33.1 sec                        10.4   10.95 )-----------( 368      ( 22 Dec 2020 07:30 )             31.4           140  |  108  |  12  ----------------------------<  112<H>  4.3   |  25  |  0.68    Ca    9.1      22 Dec 2020 07:30    TPro  7.4  /  Alb  2.6<L>  /  TBili  0.3  /  DBili  x   /  AST  34  /  ALT  91<H>  /  AlkPhos  277<H>        PT/INR - ( 21 Dec 2020 15:22 )   PT: 14.2 sec;   INR: 1.24 ratio         PTT - ( 21 Dec 2020 15:22 )  PTT:33.1 sec				    MEDICATIONS  (STANDING):  ascorbic acid 500 milliGRAM(s) Oral daily  cholecalciferol 1000 Unit(s) Oral daily  dexAMETHasone  Injectable 6 milliGRAM(s) IV Push daily  levothyroxine 75 MICROGram(s) Oral daily  pantoprazole    Tablet 40 milliGRAM(s) Oral before breakfast  rivaroxaban 15 milliGRAM(s) Oral two times a day with meals  zinc sulfate 220 milliGRAM(s) Oral daily            **Current DVT Prophylaxis:    LMWH                   (  )  Heparin SQ           (  )  Coumadin             (  )  Xarelto                  ( x )  Eliquis                   (  )  Venodynes           ( x )  Ambulation          ( x )  UFH                       (  )  ECASA                   (  )  Contraindicated  (  )

## 2020-12-23 NOTE — DISCHARGE NOTE PROVIDER - NSDCCPCAREPLAN_GEN_ALL_CORE_FT
PRINCIPAL DISCHARGE DIAGNOSIS  Diagnosis: COVID-19  Assessment and Plan of Treatment: -your oxygen levels are normal now.   -isolation      SECONDARY DISCHARGE DIAGNOSES  Diagnosis: Infiltrate of lung present on imaging of chest  Assessment and Plan of Treatment:     Diagnosis: COVID-19  Assessment and Plan of Treatment:      PRINCIPAL DISCHARGE DIAGNOSIS  Diagnosis: COVID-19  Assessment and Plan of Treatment: -your oxygen levels are normal now.   -isolation for minimun of 10 days. follow CDC guidelines  Call your local emergency number (911 in the [place]) or go to the emergency department if:   •You have trouble breathing or shortness of breath at rest.  •You have chest pain or pressure that lasts longer than 5 minutes.  •You become confused or hard to wake.  •Your lips or face are blue.  •You have a fever of 104°F (40°C) or higher.  Call your doctor if:   •You do not have symptoms of COVID-19 but had close physical contact within 14 days with someone who tested positive.  •You have questions or concerns about your condition or care      SECONDARY DISCHARGE DIAGNOSES  Diagnosis: Elevated liver enzymes  Assessment and Plan of Treatment: -liver enzymes are slightly elevated. please have a repeat blood test to recheck liver enzymes on 1/2/21  -if you have yellow discoloration of your eyes or skin or any abdominal pain then call your doctor right away or come to the ER    Diagnosis: Suspected pulmonary embolism  Assessment and Plan of Treatment: -your CT scan was abnormal suggestive of pulmonary emboli.   -started on Xarelto (script sent to pharmacy)  -please watch for signs of bleeding  -follow up with PCP

## 2020-12-23 NOTE — DISCHARGE NOTE NURSING/CASE MANAGEMENT/SOCIAL WORK - PATIENT PORTAL LINK FT
You can access the FollowMyHealth Patient Portal offered by [facility] by registering at the following website: [email or web address]. By joining [employer]’s FollowMyHealth portal, you will also be able to view your health information using other applications (apps) compatible with our system.

## 2020-12-24 ENCOUNTER — TRANSCRIPTION ENCOUNTER (OUTPATIENT)
Age: 70
End: 2020-12-24

## 2020-12-26 LAB
CULTURE RESULTS: SIGNIFICANT CHANGE UP
SPECIMEN SOURCE: SIGNIFICANT CHANGE UP

## 2020-12-27 DIAGNOSIS — K83.8 OTHER SPECIFIED DISEASES OF BILIARY TRACT: ICD-10-CM

## 2020-12-27 DIAGNOSIS — U07.1 COVID-19: ICD-10-CM

## 2020-12-27 DIAGNOSIS — R74.01 ELEVATION OF LEVELS OF LIVER TRANSAMINASE LEVELS: ICD-10-CM

## 2020-12-27 DIAGNOSIS — R09.02 HYPOXEMIA: ICD-10-CM

## 2020-12-27 DIAGNOSIS — E03.9 HYPOTHYROIDISM, UNSPECIFIED: ICD-10-CM

## 2020-12-27 DIAGNOSIS — Z79.890 HORMONE REPLACEMENT THERAPY: ICD-10-CM

## 2020-12-27 DIAGNOSIS — Z90.49 ACQUIRED ABSENCE OF OTHER SPECIFIED PARTS OF DIGESTIVE TRACT: ICD-10-CM

## 2020-12-27 DIAGNOSIS — R51.9 HEADACHE, UNSPECIFIED: ICD-10-CM

## 2020-12-27 DIAGNOSIS — J12.89 OTHER VIRAL PNEUMONIA: ICD-10-CM

## 2020-12-27 DIAGNOSIS — E87.6 HYPOKALEMIA: ICD-10-CM

## 2020-12-27 DIAGNOSIS — I26.99 OTHER PULMONARY EMBOLISM WITHOUT ACUTE COR PULMONALE: ICD-10-CM

## 2020-12-29 ENCOUNTER — TRANSCRIPTION ENCOUNTER (OUTPATIENT)
Age: 70
End: 2020-12-29

## 2021-01-01 NOTE — ED ADULT NURSE NOTE - DRUG PRE-SCREENING (DAST -1)
Problem: Knowledge deficit - Parent/Caregiver  Goal: Family involved in care of child  Outcome: PROGRESSING AS EXPECTED  Note: Parent's of twins present at bedside after participating in 1800 care time for day shift.      Problem: Psychosocial/Developmental  Goal: Provide an environment that responds to the individual infant's neurophysiologic, behavior and social development  Outcome: PROGRESSING AS EXPECTED  Note: Low Stimuli Environment Provided.      Problem: Thermoregulation  Goal: Maintain body temperature (Axillary temp 36.5-37.5 C)  Outcome: PROGRESSING AS EXPECTED  Note: No cold temps recorded this shift.      Problem: Oxygenation/Respiratory Function  Goal: Patient will maintain patent airway  Outcome: PROGRESSING AS EXPECTED  Note: Patient remains in room air with no desaturations or distress noted.      Problem: Nutrition/Feeding  Goal: Balanced Nutritional Intake  Outcome: PROGRESSING AS EXPECTED  Note: Infant nippling approximately half or more of each feed. No full feedings taken PO this shift.      Problem: Breastfeeding  Goal: Baby able to breast feed once per shift at discharge  Outcome: PROGRESSING AS EXPECTED  Note: Infant able to breast feed once a shift with active order.       Statement Selected

## 2021-01-04 ENCOUNTER — TRANSCRIPTION ENCOUNTER (OUTPATIENT)
Age: 71
End: 2021-01-04

## 2021-01-22 RX ORDER — RIVAROXABAN 15 MG-20MG
1 KIT ORAL
Qty: 30 | Refills: 1
Start: 2021-01-22 | End: 2021-03-22

## 2021-06-18 ENCOUNTER — APPOINTMENT (OUTPATIENT)
Dept: DERMATOLOGY | Facility: CLINIC | Age: 71
End: 2021-06-18

## 2021-06-18 PROBLEM — E03.9 HYPOTHYROIDISM, UNSPECIFIED: Chronic | Status: ACTIVE | Noted: 2020-12-22

## 2021-06-18 PROBLEM — Z00.00 ENCOUNTER FOR PREVENTIVE HEALTH EXAMINATION: Status: ACTIVE | Noted: 2021-06-18

## 2021-06-22 ENCOUNTER — APPOINTMENT (OUTPATIENT)
Dept: DERMATOLOGY | Facility: CLINIC | Age: 71
End: 2021-06-22

## 2022-04-25 ENCOUNTER — TRANSCRIPTION ENCOUNTER (OUTPATIENT)
Age: 72
End: 2022-04-25

## 2022-11-19 ENCOUNTER — NON-APPOINTMENT (OUTPATIENT)
Age: 72
End: 2022-11-19

## 2023-01-20 NOTE — ED ADULT NURSE NOTE - CHPI ED NUR SYMPTOMS POS
Alfie,    Currently Rosa Valentin is scheduled for her STAT Service-to RHEUMATOLOGY IMMUNOLOGY  order on 1/23/2023. Due to the increased priority of the order, please review the scheduled appointment and determine if the appointment date aligns with the urgency of the diagnosis. If determined the patient needs to be seen sooner, please call and reschedule the appointment.      Thank you,  Leigha Conway    Care Coordination Operations Team  [facility]      
COUGH/SHORTNESS OF BREATH

## 2023-04-27 ENCOUNTER — NON-APPOINTMENT (OUTPATIENT)
Age: 73
End: 2023-04-27

## 2023-04-28 ENCOUNTER — EMERGENCY (EMERGENCY)
Facility: HOSPITAL | Age: 73
LOS: 0 days | Discharge: HOME | End: 2023-04-28
Attending: EMERGENCY MEDICINE
Payer: MEDICARE

## 2023-04-28 VITALS
SYSTOLIC BLOOD PRESSURE: 149 MMHG | RESPIRATION RATE: 18 BRPM | OXYGEN SATURATION: 98 % | DIASTOLIC BLOOD PRESSURE: 77 MMHG | TEMPERATURE: 98 F | HEART RATE: 87 BPM

## 2023-04-28 VITALS
TEMPERATURE: 98 F | HEART RATE: 68 BPM | DIASTOLIC BLOOD PRESSURE: 71 MMHG | RESPIRATION RATE: 17 BRPM | SYSTOLIC BLOOD PRESSURE: 143 MMHG | OXYGEN SATURATION: 100 %

## 2023-04-28 DIAGNOSIS — M79.605 PAIN IN LEFT LEG: ICD-10-CM

## 2023-04-28 DIAGNOSIS — R07.9 CHEST PAIN, UNSPECIFIED: ICD-10-CM

## 2023-04-28 DIAGNOSIS — R06.02 SHORTNESS OF BREATH: ICD-10-CM

## 2023-04-28 DIAGNOSIS — I10 ESSENTIAL (PRIMARY) HYPERTENSION: ICD-10-CM

## 2023-04-28 DIAGNOSIS — Z79.01 LONG TERM (CURRENT) USE OF ANTICOAGULANTS: ICD-10-CM

## 2023-04-28 DIAGNOSIS — E78.5 HYPERLIPIDEMIA, UNSPECIFIED: ICD-10-CM

## 2023-04-28 DIAGNOSIS — E03.9 HYPOTHYROIDISM, UNSPECIFIED: ICD-10-CM

## 2023-04-28 LAB
ALBUMIN SERPL ELPH-MCNC: 3.9 G/DL — SIGNIFICANT CHANGE UP (ref 3.3–5)
ALP SERPL-CCNC: 123 U/L — HIGH (ref 40–120)
ALT FLD-CCNC: 67 U/L — SIGNIFICANT CHANGE UP (ref 12–78)
ANION GAP SERPL CALC-SCNC: 3 MMOL/L — LOW (ref 5–17)
APTT BLD: 34.4 SEC — SIGNIFICANT CHANGE UP (ref 27.5–35.5)
AST SERPL-CCNC: 37 U/L — SIGNIFICANT CHANGE UP (ref 15–37)
BASOPHILS # BLD AUTO: 0.08 K/UL — SIGNIFICANT CHANGE UP (ref 0–0.2)
BASOPHILS NFR BLD AUTO: 1.1 % — SIGNIFICANT CHANGE UP (ref 0–2)
BILIRUB SERPL-MCNC: 0.4 MG/DL — SIGNIFICANT CHANGE UP (ref 0.2–1.2)
BUN SERPL-MCNC: 14 MG/DL — SIGNIFICANT CHANGE UP (ref 7–23)
CALCIUM SERPL-MCNC: 9.4 MG/DL — SIGNIFICANT CHANGE UP (ref 8.5–10.1)
CHLORIDE SERPL-SCNC: 108 MMOL/L — SIGNIFICANT CHANGE UP (ref 96–108)
CO2 SERPL-SCNC: 26 MMOL/L — SIGNIFICANT CHANGE UP (ref 22–31)
CREAT SERPL-MCNC: 0.91 MG/DL — SIGNIFICANT CHANGE UP (ref 0.5–1.3)
D DIMER BLD IA.RAPID-MCNC: <150 NG/ML DDU — SIGNIFICANT CHANGE UP
EGFR: 67 ML/MIN/1.73M2 — SIGNIFICANT CHANGE UP
EOSINOPHIL # BLD AUTO: 0.04 K/UL — SIGNIFICANT CHANGE UP (ref 0–0.5)
EOSINOPHIL NFR BLD AUTO: 0.5 % — SIGNIFICANT CHANGE UP (ref 0–6)
GLUCOSE SERPL-MCNC: 111 MG/DL — HIGH (ref 70–99)
HCT VFR BLD CALC: 41.5 % — SIGNIFICANT CHANGE UP (ref 34.5–45)
HGB BLD-MCNC: 13.5 G/DL — SIGNIFICANT CHANGE UP (ref 11.5–15.5)
IMM GRANULOCYTES NFR BLD AUTO: 0.8 % — SIGNIFICANT CHANGE UP (ref 0–0.9)
INR BLD: 1.08 RATIO — SIGNIFICANT CHANGE UP (ref 0.88–1.16)
LYMPHOCYTES # BLD AUTO: 1.66 K/UL — SIGNIFICANT CHANGE UP (ref 1–3.3)
LYMPHOCYTES # BLD AUTO: 21.9 % — SIGNIFICANT CHANGE UP (ref 13–44)
MCHC RBC-ENTMCNC: 29.6 PG — SIGNIFICANT CHANGE UP (ref 27–34)
MCHC RBC-ENTMCNC: 32.5 GM/DL — SIGNIFICANT CHANGE UP (ref 32–36)
MCV RBC AUTO: 91 FL — SIGNIFICANT CHANGE UP (ref 80–100)
MONOCYTES # BLD AUTO: 0.56 K/UL — SIGNIFICANT CHANGE UP (ref 0–0.9)
MONOCYTES NFR BLD AUTO: 7.4 % — SIGNIFICANT CHANGE UP (ref 2–14)
NEUTROPHILS # BLD AUTO: 5.18 K/UL — SIGNIFICANT CHANGE UP (ref 1.8–7.4)
NEUTROPHILS NFR BLD AUTO: 68.3 % — SIGNIFICANT CHANGE UP (ref 43–77)
PLATELET # BLD AUTO: 316 K/UL — SIGNIFICANT CHANGE UP (ref 150–400)
POTASSIUM SERPL-MCNC: 4.1 MMOL/L — SIGNIFICANT CHANGE UP (ref 3.5–5.3)
POTASSIUM SERPL-SCNC: 4.1 MMOL/L — SIGNIFICANT CHANGE UP (ref 3.5–5.3)
PROT SERPL-MCNC: 7.8 GM/DL — SIGNIFICANT CHANGE UP (ref 6–8.3)
PROTHROM AB SERPL-ACNC: 12.5 SEC — SIGNIFICANT CHANGE UP (ref 10.5–13.4)
RBC # BLD: 4.56 M/UL — SIGNIFICANT CHANGE UP (ref 3.8–5.2)
RBC # FLD: 13.2 % — SIGNIFICANT CHANGE UP (ref 10.3–14.5)
SODIUM SERPL-SCNC: 137 MMOL/L — SIGNIFICANT CHANGE UP (ref 135–145)
TROPONIN I, HIGH SENSITIVITY RESULT: 3.16 NG/L — SIGNIFICANT CHANGE UP
TROPONIN I, HIGH SENSITIVITY RESULT: 3.65 NG/L — SIGNIFICANT CHANGE UP
WBC # BLD: 7.58 K/UL — SIGNIFICANT CHANGE UP (ref 3.8–10.5)
WBC # FLD AUTO: 7.58 K/UL — SIGNIFICANT CHANGE UP (ref 3.8–10.5)

## 2023-04-28 PROCEDURE — 85730 THROMBOPLASTIN TIME PARTIAL: CPT

## 2023-04-28 PROCEDURE — 99285 EMERGENCY DEPT VISIT HI MDM: CPT

## 2023-04-28 PROCEDURE — 71046 X-RAY EXAM CHEST 2 VIEWS: CPT

## 2023-04-28 PROCEDURE — 99285 EMERGENCY DEPT VISIT HI MDM: CPT | Mod: 25

## 2023-04-28 PROCEDURE — 36415 COLL VENOUS BLD VENIPUNCTURE: CPT

## 2023-04-28 PROCEDURE — 80053 COMPREHEN METABOLIC PANEL: CPT

## 2023-04-28 PROCEDURE — 71046 X-RAY EXAM CHEST 2 VIEWS: CPT | Mod: 26

## 2023-04-28 PROCEDURE — 85379 FIBRIN DEGRADATION QUANT: CPT

## 2023-04-28 PROCEDURE — 93010 ELECTROCARDIOGRAM REPORT: CPT

## 2023-04-28 PROCEDURE — 93005 ELECTROCARDIOGRAM TRACING: CPT

## 2023-04-28 PROCEDURE — 85610 PROTHROMBIN TIME: CPT

## 2023-04-28 PROCEDURE — 85025 COMPLETE CBC W/AUTO DIFF WBC: CPT

## 2023-04-28 PROCEDURE — 84484 ASSAY OF TROPONIN QUANT: CPT

## 2023-04-28 NOTE — ED STATDOCS - PATIENT PORTAL LINK FT
You can access the FollowMyHealth Patient Portal offered by [facility] by registering at the following website: [email or web address]. By joining [employer]’s FollowMyHealth portal, you will also be able to view your health information using other applications (apps) compatible with our system.

## 2023-04-28 NOTE — ED ADULT NURSE NOTE - OBJECTIVE STATEMENT
Pt presents to ED for palpitations, blurry vision while driving today, intermittent SOB, and cough for 2 days. Pt states "I was driving my granddaughter to school and I felt like I could not see but I also think it might be anxiety". Pt denies chest pain, dizziness, fever, sick contacts.

## 2023-04-28 NOTE — ED STATDOCS - ATTENDING APP SHARED VISIT CONTRIBUTION OF CARE
I,Hany Richards MD,  performed the initial face to face bedside interview with this patient regarding history of present illness, review of symptoms and relevant past medical, social and family history.  I completed an independent physical examination.  I was the initial provider who evaluated this patient. I have signed out the follow up of any pending tests (i.e. labs, radiological studies) to the ACP.  I have communicated the patient’s plan of care and disposition with the ACP.  The history, relevant review of systems, past medical and surgical history, medical decision making, and physical examination was documented by the scribe in my presence and I attest to the accuracy of the documentation.

## 2023-04-28 NOTE — ED STATDOCS - PROGRESS NOTE DETAILS
patient reevaluated, chest pain free  discussed need to follow up  appointment given with Dr. Kendall, cardio

## 2023-04-28 NOTE — ED STATDOCS - CLINICAL SUMMARY MEDICAL DECISION MAKING FREE TEXT BOX
71 y/o female hx of HTN and HLD with intermittent exertional CP x2 days. Concern for possible anginal pain. Will do cardio workup, consider cardio eval. 73 y/o female hx of HTN and HLD with intermittent exertional CP x2 days. Concern for possible anginal pain. Will do cardio workup, c.

## 2023-04-28 NOTE — ED STATDOCS - OBJECTIVE STATEMENT
73 y/o female with a PMHx of hypothyroid, HTN, HLD presents to the ED c/o intermittent CP and SOB x2 days. Pt also reports LLE pain and "heaviness."  Nonsmoker. No other complaints at this time. PCP: Dr. Oneill.

## 2023-04-28 NOTE — ED STATDOCS - NS ED ATTENDING STATEMENT MOD
This was a shared visit with the [facility]. I reviewed and verified the documentation and independently performed the documented:

## 2023-05-01 ENCOUNTER — NON-APPOINTMENT (OUTPATIENT)
Age: 73
End: 2023-05-01

## 2023-05-01 ENCOUNTER — APPOINTMENT (OUTPATIENT)
Dept: CARDIOLOGY | Facility: CLINIC | Age: 73
End: 2023-05-01
Payer: MEDICARE

## 2023-05-01 VITALS
WEIGHT: 193 LBS | SYSTOLIC BLOOD PRESSURE: 146 MMHG | HEART RATE: 71 BPM | OXYGEN SATURATION: 98 % | HEIGHT: 63 IN | DIASTOLIC BLOOD PRESSURE: 80 MMHG | BODY MASS INDEX: 34.2 KG/M2

## 2023-05-01 DIAGNOSIS — Z82.49 FAMILY HISTORY OF ISCHEMIC HEART DISEASE AND OTHER DISEASES OF THE CIRCULATORY SYSTEM: ICD-10-CM

## 2023-05-01 DIAGNOSIS — E03.9 HYPOTHYROIDISM, UNSPECIFIED: ICD-10-CM

## 2023-05-01 DIAGNOSIS — I26.99 COVID-19: ICD-10-CM

## 2023-05-01 DIAGNOSIS — U07.1 COVID-19: ICD-10-CM

## 2023-05-01 DIAGNOSIS — Z78.9 OTHER SPECIFIED HEALTH STATUS: ICD-10-CM

## 2023-05-01 PROCEDURE — 99204 OFFICE O/P NEW MOD 45 MIN: CPT | Mod: 25

## 2023-05-01 PROCEDURE — 93000 ELECTROCARDIOGRAM COMPLETE: CPT

## 2023-05-01 NOTE — PHYSICAL EXAM
[Well Developed] : well developed [No Acute Distress] : no acute distress [Obese] : obese [Normal Conjunctiva] : normal conjunctiva [Normal Venous Pressure] : normal venous pressure [No Carotid Bruit] : no carotid bruit [Normal S1, S2] : normal S1, S2 [No Murmur] : no murmur [No Rub] : no rub [No Gallop] : no gallop [Clear Lung Fields] : clear lung fields [Good Air Entry] : good air entry [No Respiratory Distress] : no respiratory distress  [Soft] : abdomen soft [Non Tender] : non-tender [Normal Bowel Sounds] : normal bowel sounds [Normal Gait] : normal gait [No Edema] : no edema [No Cyanosis] : no cyanosis [No Clubbing] : no clubbing [No Varicosities] : no varicosities [No Rash] : no rash [No Skin Lesions] : no skin lesions [Moves all extremities] : moves all extremities [No Focal Deficits] : no focal deficits [Normal Speech] : normal speech [Alert and Oriented] : alert and oriented [Normal memory] : normal memory

## 2023-05-01 NOTE — HISTORY OF PRESENT ILLNESS
[FreeTextEntry1] : 72 year old female with hx of HTN HLD , hypothyroidism  ( prior PE post covid jan 2021 ( completed anticoagulation duration ) came for cardiac evaluation with complain that she was in [facility] on 4/28/23 with complain of developed racing feeling in the chest ,( palpitation ) went to urgent care  then was  sent to ER , \par \par patient says she was having some gasy feeling epigastric discomfort after eating certain food ,relieve with drinking water , started week prior , patient also have having chronic constipation \par \par Patient says palpitations were lasted for few seconds ,   went to ER , had negative D dimer ,  negative troponin \par \par Patient also says she started feeling mild shortness of breath on exertion on taking stairs , noticed after she returned from hospital   not associated with chest pain or palpitation ,  \par \par patient blood pressure is mild elevated , patient feels anxious , does feel anxiety \par \par Patient had prior hx of pulmonary embolism related to covid , was treated for 6 months in 2021

## 2023-05-01 NOTE — ASSESSMENT
[FreeTextEntry1] : patient with above hx \par \par Atypical chest pain/ palpitation episodes : evaluated in ER , negative troponin , d dimer , normal ekg ? likely GERD vs anxiety recommend to pepsid 20 mg po daily , will obtain echo , exercise echo to rule out ischemia  \par \par \par NO : on activity ,  likely hypertensive heart disease , uncontrolled hypertension , ? decondition , anxiety component as patient symptoms started after hospital visit , patient did normal D dimer level , normal troponin , no EKg changes ,  recommend echo , exercise stress echo to rule out ischemia , to assess functional status\par \par HTN : mild uncontrolled  : encourage patient to follow low salt diet , increase the losartan to 50 mg po daily  home BP monitor , echo to asses ventricular function \par \par Hyperlipidemia : controlled on current dose of medication , continue rosuvastatin 5 mg po daily \par \par \par Hypothyroidism : continue current medication \par \par Obesity : encourage patient to follow life style modification , diet restriction , exercise ,

## 2023-05-04 ENCOUNTER — RX CHANGE (OUTPATIENT)
Age: 73
End: 2023-05-04

## 2023-05-17 ENCOUNTER — APPOINTMENT (OUTPATIENT)
Dept: CARDIOLOGY | Facility: CLINIC | Age: 73
End: 2023-05-17
Payer: MEDICARE

## 2023-05-17 PROCEDURE — 93351 STRESS TTE COMPLETE: CPT

## 2023-05-17 PROCEDURE — 93320 DOPPLER ECHO COMPLETE: CPT

## 2023-05-17 PROCEDURE — 93325 DOPPLER ECHO COLOR FLOW MAPG: CPT

## 2023-05-19 ENCOUNTER — APPOINTMENT (OUTPATIENT)
Dept: CARDIOLOGY | Facility: CLINIC | Age: 73
End: 2023-05-19
Payer: MEDICARE

## 2023-05-19 PROCEDURE — 93306 TTE W/DOPPLER COMPLETE: CPT

## 2023-05-23 ENCOUNTER — APPOINTMENT (OUTPATIENT)
Dept: CARDIOLOGY | Facility: CLINIC | Age: 73
End: 2023-05-23
Payer: MEDICARE

## 2023-05-23 VITALS
DIASTOLIC BLOOD PRESSURE: 72 MMHG | HEIGHT: 63 IN | BODY MASS INDEX: 33.66 KG/M2 | OXYGEN SATURATION: 99 % | SYSTOLIC BLOOD PRESSURE: 142 MMHG | WEIGHT: 190 LBS | HEART RATE: 78 BPM

## 2023-05-23 PROCEDURE — 99214 OFFICE O/P EST MOD 30 MIN: CPT

## 2023-05-23 RX ORDER — ROSUVASTATIN CALCIUM 5 MG/1
5 TABLET, FILM COATED ORAL
Refills: 0 | Status: ACTIVE | COMMUNITY
Start: 2023-03-08

## 2023-05-23 RX ORDER — LEVOTHYROXINE SODIUM 0.07 MG/1
75 TABLET ORAL
Refills: 0 | Status: ACTIVE | COMMUNITY
Start: 2023-03-08

## 2023-05-23 NOTE — CARDIOLOGY SUMMARY
[de-identified] : 5/1/23 sinus rhythm  [de-identified] :   5/19/23 10.1 mets 101%MPHR  NO ST changes   No evidence of stress induced wall motion abnormalities seen on stress echo images  [de-identified] : 5/19/23   Normal EF  Mild DD  normal RV function IASA

## 2023-05-23 NOTE — ASSESSMENT
[FreeTextEntry1] : patient with above hx \par \par Atypical chest pain/ palpitation episodes : evaluated in ER , negative troponin , d dimer , normal ekg ? likely GERD vs anxiety recommend to pepsid 20 mg po daily , Normal echo , normal stress echo test\par \par \par echo showed interatrial septal anurysm  no shunt on color doppler , will monitor \par \par NO : on activity ,  likely hypertensive heart disease , uncontrolled hypertension , ? decondition , anxiety component as patient symptoms started after hospital visit , patient did normal D dimer level , normal troponin , no EKg changes ,   Normal stress echo \par \par \par HTN : mild uncontrolled  : encourage patient to follow low salt diet ,  continue  losartan to 50 mg po daily  home BP monitor ,\par \par Hyperlipidemia : controlled on current dose of medication , continue rosuvastatin 5 mg po daily  repeat b lood work to follow up \par \par \par Hypothyroidism : continue current medication \par \par Obesity : encourage patient to follow life style modification , diet restriction , exercise ,

## 2023-05-23 NOTE — HISTORY OF PRESENT ILLNESS
[FreeTextEntry1] : 72 year old female with hx of HTN HLD , hypothyroidism  ( prior PE post covid jan 2021 ( completed anticoagulation duration ) came for follow up says she is feeling much better , no racing feeling , patient  had  Normal ventricular systolic function ,  normal stress echocardiogram , no arrhythmia , \par \par patient says she was having some gasy feeling epigastric discomfort after eating certain food ,relieve with drinking water which has improved after changing the diet \par \par patient blood pressure is mild elevated , patient feels anxious , her home BP readings are in normal range \par \par patient is scheduled to have blood work to check lipids , \par \par Patient had prior hx of pulmonary embolism related to covid , was treated for 6 months in 2021

## 2023-12-12 ENCOUNTER — RX RENEWAL (OUTPATIENT)
Age: 73
End: 2023-12-12

## 2024-02-07 ENCOUNTER — APPOINTMENT (OUTPATIENT)
Dept: CARDIOLOGY | Facility: CLINIC | Age: 74
End: 2024-02-07
Payer: MEDICARE

## 2024-02-07 ENCOUNTER — NON-APPOINTMENT (OUTPATIENT)
Age: 74
End: 2024-02-07

## 2024-02-07 VITALS — DIASTOLIC BLOOD PRESSURE: 70 MMHG | SYSTOLIC BLOOD PRESSURE: 142 MMHG

## 2024-02-07 VITALS
HEART RATE: 76 BPM | OXYGEN SATURATION: 100 % | WEIGHT: 196 LBS | BODY MASS INDEX: 34.73 KG/M2 | HEIGHT: 63 IN | DIASTOLIC BLOOD PRESSURE: 70 MMHG | SYSTOLIC BLOOD PRESSURE: 150 MMHG

## 2024-02-07 DIAGNOSIS — E78.5 HYPERLIPIDEMIA, UNSPECIFIED: ICD-10-CM

## 2024-02-07 DIAGNOSIS — R06.02 SHORTNESS OF BREATH: ICD-10-CM

## 2024-02-07 DIAGNOSIS — I25.3 ANEURYSM OF HEART: ICD-10-CM

## 2024-02-07 PROCEDURE — G2211 COMPLEX E/M VISIT ADD ON: CPT

## 2024-02-07 PROCEDURE — 99214 OFFICE O/P EST MOD 30 MIN: CPT

## 2024-02-07 PROCEDURE — 93000 ELECTROCARDIOGRAM COMPLETE: CPT

## 2024-02-07 NOTE — PHYSICAL EXAM
[Well Developed] : well developed [No Acute Distress] : no acute distress [Obese] : obese [Normal Conjunctiva] : normal conjunctiva [Normal Venous Pressure] : normal venous pressure [No Carotid Bruit] : no carotid bruit [Normal S1, S2] : normal S1, S2 [No Rub] : no rub [No Gallop] : no gallop [Clear Lung Fields] : clear lung fields [Good Air Entry] : good air entry [No Respiratory Distress] : no respiratory distress  [Soft] : abdomen soft [Non Tender] : non-tender [Normal Bowel Sounds] : normal bowel sounds [Normal Gait] : normal gait [No Edema] : no edema [No Cyanosis] : no cyanosis [No Clubbing] : no clubbing [No Varicosities] : no varicosities [No Rash] : no rash [No Skin Lesions] : no skin lesions [Moves all extremities] : moves all extremities [No Focal Deficits] : no focal deficits [Normal Speech] : normal speech [Alert and Oriented] : alert and oriented [Normal memory] : normal memory [Murmur] : murmur [de-identified] : 2/6 SM

## 2024-02-07 NOTE — CARDIOLOGY SUMMARY
[de-identified] : 2/7/24 sinus rhythm  [de-identified] :   5/19/23 10.1 mets 101%MPHR  NO ST changes   No evidence of stress induced wall motion abnormalities seen on stress echo images  [de-identified] : 5/19/23   Normal EF  Mild DD  normal RV function IASA

## 2024-02-07 NOTE — HISTORY OF PRESENT ILLNESS
[FreeTextEntry1] : 72 year old female with hx of HTN HLD , hypothyroidism  ( prior PE post covid jan 2021 ( completed anticoagulation duration ) came for follow up says she is feeling  fine  , no racing feeling , patient  had  Normal ventricular systolic function ,  normal stress echocardiogram , no arrhythmia ,   patient says she was having some gasy feeling epigastric discomfort after eating certain food ,relieve with drinking water which has improved after changing the diet   patient blood pressure is mild elevated , not very adherent to low salt diet and also  patient feels anxious , her home BP readings are in normal range   patient  had blood work with primary , was told to have controlled lipids   Patient had prior hx of pulmonary embolism related to covid , was treated for 6 months in 2021

## 2024-02-07 NOTE — ASSESSMENT
[FreeTextEntry1] : patient with above hx   HTN : mild uncontrolled  : encourage patient to follow low salt diet ,  continue  losartan to 50 mg po daily  home BP monitor ,  Hyperlipidemia : controlled on current dose of medication , continue rosuvastatin 5 mg po daily  repeat b lood work to follow up   Atypical chest pain/ palpitation episodes : evaluated in ER , negative troponin , d dimer , normal ekg ? likely GERD vs anxiety recommend to pepsid 20 mg po daily , Normal echo , normal stress echo test echo showed interatrial septal anurysm  no shunt on color doppler , will monitor   NO : on activity ,  likely hypertensive heart disease , uncontrolled hypertension , ? decondition , anxiety component as patient symptoms started after hospital visit , patient did normal D dimer level , normal troponin , no EKg changes ,   Normal stress echo   Hypothyroidism : continue current medication   Obesity : encourage patient to follow life style modification , diet restriction , exercise ,

## 2024-03-18 NOTE — ED PROVIDER NOTE - DOMESTIC TRAVEL HIGH RISK QUESTION
- polypharmacy noted on meds list from [facility] however UDS negative   - limited with pain control at present given hypotension  - will treat conservatively and add back home meds as BP improves    No

## 2024-03-27 NOTE — H&P ADULT - PSYCHIATRIC
FAMILY HISTORY:  Family history of diabetes mellitus (DM), mother  FH: HTN (hypertension), father    
detailed exam

## 2024-06-24 DIAGNOSIS — I10 ESSENTIAL (PRIMARY) HYPERTENSION: ICD-10-CM

## 2024-06-25 RX ORDER — LOSARTAN POTASSIUM 50 MG/1
50 TABLET, FILM COATED ORAL
Qty: 30 | Refills: 0 | Status: ACTIVE | COMMUNITY
Start: 2022-12-15 | End: 1900-01-01

## 2024-08-12 ENCOUNTER — APPOINTMENT (OUTPATIENT)
Dept: CARDIOLOGY | Facility: CLINIC | Age: 74
End: 2024-08-12
Payer: MEDICARE

## 2024-08-12 ENCOUNTER — NON-APPOINTMENT (OUTPATIENT)
Age: 74
End: 2024-08-12

## 2024-08-12 VITALS
WEIGHT: 187 LBS | SYSTOLIC BLOOD PRESSURE: 150 MMHG | BODY MASS INDEX: 33.13 KG/M2 | DIASTOLIC BLOOD PRESSURE: 80 MMHG | HEART RATE: 89 BPM | OXYGEN SATURATION: 97 %

## 2024-08-12 VITALS — SYSTOLIC BLOOD PRESSURE: 160 MMHG | DIASTOLIC BLOOD PRESSURE: 80 MMHG

## 2024-08-12 DIAGNOSIS — E03.9 HYPOTHYROIDISM, UNSPECIFIED: ICD-10-CM

## 2024-08-12 DIAGNOSIS — I10 ESSENTIAL (PRIMARY) HYPERTENSION: ICD-10-CM

## 2024-08-12 DIAGNOSIS — I25.3 ANEURYSM OF HEART: ICD-10-CM

## 2024-08-12 PROCEDURE — G2211 COMPLEX E/M VISIT ADD ON: CPT

## 2024-08-12 PROCEDURE — 93000 ELECTROCARDIOGRAM COMPLETE: CPT

## 2024-08-12 PROCEDURE — 99214 OFFICE O/P EST MOD 30 MIN: CPT

## 2024-08-12 NOTE — ASSESSMENT
[FreeTextEntry1] : patient with above hx   HTN : uncontrolled  : encourage patient to follow low salt diet ,  increase  losartan to 100  mg po daily  home BP monitor ,  Hyperlipidemia : controlled on current dose of medication , continue rosuvastatin 5 mg po daily  repeat b lood work to follow up   Atypical chest pain/ palpitation episodes : evaluated in ER , negative troponin , d dimer , normal ekg ? likely GERD vs anxiety recommend to pepsid 20 mg po daily , Normal echo , normal stress echo test echo showed interatrial septal aneurysm  no shunt on color doppler , will monitor   NO : on activity , got much better  likely hypertensive heart disease , uncontrolled hypertension , ? decondition , anxiety component as patient symptoms started after hospital visit , patient did normal D dimer level , normal troponin , no EKg changes ,   Normal stress echo   Hypothyroidism : continue current medication   Obesity : encourage patient to follow life style modification , diet restriction , exercise ,

## 2024-08-12 NOTE — PHYSICAL EXAM
[Well Developed] : well developed [No Acute Distress] : no acute distress [Obese] : obese [Normal Conjunctiva] : normal conjunctiva [Normal Venous Pressure] : normal venous pressure [No Carotid Bruit] : no carotid bruit [Normal S1, S2] : normal S1, S2 [No Rub] : no rub [No Gallop] : no gallop [Murmur] : murmur [Clear Lung Fields] : clear lung fields [No Respiratory Distress] : no respiratory distress  [Good Air Entry] : good air entry [Soft] : abdomen soft [Non Tender] : non-tender [Normal Bowel Sounds] : normal bowel sounds [Normal Gait] : normal gait [No Edema] : no edema [No Cyanosis] : no cyanosis [No Clubbing] : no clubbing [No Varicosities] : no varicosities [No Rash] : no rash [No Skin Lesions] : no skin lesions [Moves all extremities] : moves all extremities [No Focal Deficits] : no focal deficits [Normal Speech] : normal speech [Alert and Oriented] : alert and oriented [Normal memory] : normal memory [de-identified] : 2/6 SM

## 2024-08-12 NOTE — CARDIOLOGY SUMMARY
[de-identified] : 8/12/24 sinus rhythm  [de-identified] :   5/19/23 10.1 mets 101%MPHR  NO ST changes   No evidence of stress induced wall motion abnormalities seen on stress echo images  [de-identified] : 5/19/23   Normal EF  Mild DD  normal RV function IASA

## 2024-08-12 NOTE — HISTORY OF PRESENT ILLNESS
[FreeTextEntry1] : Patient  with hx of HTN HLD , hypothyroidism  ( prior PE post covid jan 2021 ( completed anticoagulation duration ) came for follow up says she is feeling  fine  , no racing feeling ,  her blood pressure is elevated as she was not adherent to low salt diet   patient  had  Normal ventricular systolic function ,  normal stress echocardiogram , no arrhythmia ,  her breathing got much better , able to walk 2 miles a day   patient says she was having some gasy feeling epigastric discomfort after eating certain food ,relieve with drinking water which has improved after changing the diet   patient blood pressure is mild elevated , not very adherent to low salt diet and also  patient feels anxious , her home BP readings are in normal range   patient  had blood work with primary , was told to have controlled lipids   Patient had prior hx of pulmonary embolism related to covid , was treated for 6 months in 2021

## 2024-08-14 RX ORDER — LOSARTAN POTASSIUM 50 MG/1
50 TABLET, FILM COATED ORAL
Qty: 90 | Refills: 0 | Status: ACTIVE | COMMUNITY
Start: 2024-06-28

## 2024-08-14 RX ORDER — CHLORHEXIDINE GLUCONATE, 0.12% ORAL RINSE 1.2 MG/ML
0.12 SOLUTION DENTAL
Qty: 473 | Refills: 0 | Status: ACTIVE | COMMUNITY
Start: 2023-12-11

## 2024-09-30 ENCOUNTER — APPOINTMENT (OUTPATIENT)
Dept: CARDIOLOGY | Facility: CLINIC | Age: 74
End: 2024-09-30
Payer: MEDICARE

## 2024-09-30 VITALS
WEIGHT: 181 LBS | DIASTOLIC BLOOD PRESSURE: 84 MMHG | HEART RATE: 90 BPM | SYSTOLIC BLOOD PRESSURE: 160 MMHG | HEIGHT: 63 IN | OXYGEN SATURATION: 99 % | BODY MASS INDEX: 32.07 KG/M2

## 2024-09-30 DIAGNOSIS — E78.5 HYPERLIPIDEMIA, UNSPECIFIED: ICD-10-CM

## 2024-09-30 DIAGNOSIS — I10 ESSENTIAL (PRIMARY) HYPERTENSION: ICD-10-CM

## 2024-09-30 DIAGNOSIS — E66.3 OVERWEIGHT: ICD-10-CM

## 2024-09-30 PROCEDURE — G2211 COMPLEX E/M VISIT ADD ON: CPT

## 2024-09-30 PROCEDURE — 99214 OFFICE O/P EST MOD 30 MIN: CPT

## 2024-09-30 NOTE — ASSESSMENT
[FreeTextEntry1] : here today for BP check with above hx   HTN: Improved from prior reading (but not optimal) with the increase of Losartan to 100 MG QD, states she is following a low sodium diet and has lost 7 LBS, her home BP readings has been averaging 120's/70's she will return to office with home BP monitor for correlation and accuracy prior to changing medications and will CW daily home BP log, she will continue to loose weight and follow a low sodium diet (has been consuming increased cheese) OV 6 weeks  Hyperlipidemia : Labs with PCP 6/28/24 Lipids poorly controlled reports PCP had increased Crestor from 5 to 10 MG, will repeat labs now   Hypothyroidism : continue current medication, PCP management   Obesity : encourage patient to follow life style modification , diet restriction , exercise ,   Plan DW patient and reviewed with Dr Palla

## 2024-09-30 NOTE — HISTORY OF PRESENT ILLNESS
[FreeTextEntry1] : 73 yo female PMH:  HTN HLD , hypothyroidism, prior hx of pulmonary embolism related to covid , was treated for 6 months in 2021, here today for BP check after increase of Losartan to 100 MG which she is tolerating well pressure today  Claims to be feeling well no cardiovascular complaints, she had been non adherent to low salt diet   patient  had  Normal ventricular systolic function ,  normal stress echocardiogram , no arrhythmia , able to walk 2 miles a day   patient says she was having some gasy feeling epigastric discomfort after eating certain food ,relieve with drinking water which has improved after changing the diet   Labs with PCP 6/28/24 Lipids poorly controlled statin up titrated VIA PCP

## 2024-09-30 NOTE — PHYSICAL EXAM
[Well Developed] : well developed [No Acute Distress] : no acute distress [Obese] : obese [Normal Conjunctiva] : normal conjunctiva [Normal Venous Pressure] : normal venous pressure [No Carotid Bruit] : no carotid bruit [Normal S1, S2] : normal S1, S2 [No Rub] : no rub [No Gallop] : no gallop [Murmur] : murmur [Clear Lung Fields] : clear lung fields [Good Air Entry] : good air entry [No Respiratory Distress] : no respiratory distress  [Soft] : abdomen soft [Non Tender] : non-tender [Normal Bowel Sounds] : normal bowel sounds [Normal Gait] : normal gait [No Edema] : no edema [No Cyanosis] : no cyanosis [No Clubbing] : no clubbing [No Varicosities] : no varicosities [No Rash] : no rash [No Skin Lesions] : no skin lesions [Moves all extremities] : moves all extremities [No Focal Deficits] : no focal deficits [Normal Speech] : normal speech [Alert and Oriented] : alert and oriented [Normal memory] : normal memory [de-identified] : 2/6 SM ( MR/TR)

## 2024-09-30 NOTE — CARDIOLOGY SUMMARY
[de-identified] : 8/12/24 sinus rhythm  [de-identified] :   5/19/23 10.1 mets 101 %MPHR  NO ST changes   No evidence of stress induced wall motion abnormalities seen on stress echo images  [de-identified] : 5/19/23   Normal EF  Mild DD  normal RV function IASA

## 2024-10-28 ENCOUNTER — APPOINTMENT (OUTPATIENT)
Dept: CARDIOLOGY | Facility: CLINIC | Age: 74
End: 2024-10-28

## 2024-12-10 ENCOUNTER — RX RENEWAL (OUTPATIENT)
Age: 74
End: 2024-12-10

## 2025-03-24 ENCOUNTER — NON-APPOINTMENT (OUTPATIENT)
Age: 75
End: 2025-03-24